# Patient Record
Sex: MALE | Race: WHITE | NOT HISPANIC OR LATINO | Employment: OTHER | URBAN - NONMETROPOLITAN AREA
[De-identification: names, ages, dates, MRNs, and addresses within clinical notes are randomized per-mention and may not be internally consistent; named-entity substitution may affect disease eponyms.]

---

## 2023-01-29 PROBLEM — E78.00 HYPERCHOLESTEROLEMIA: Status: ACTIVE | Noted: 2023-01-29

## 2023-01-29 PROBLEM — E03.9 HYPOTHYROIDISM: Status: ACTIVE | Noted: 2023-01-29

## 2023-01-29 PROBLEM — N18.32 STAGE 3B CHRONIC KIDNEY DISEASE (MULTI): Status: ACTIVE | Noted: 2023-01-29

## 2023-01-29 PROBLEM — N18.4 STAGE 4 CHRONIC KIDNEY DISEASE (MULTI): Status: ACTIVE | Noted: 2023-01-29

## 2023-01-29 PROBLEM — E66.9 CLASS 1 OBESITY WITH BODY MASS INDEX (BMI) OF 31.0 TO 31.9 IN ADULT: Status: ACTIVE | Noted: 2023-01-29

## 2023-01-29 PROBLEM — E66.811 CLASS 1 OBESITY WITH BODY MASS INDEX (BMI) OF 31.0 TO 31.9 IN ADULT: Status: ACTIVE | Noted: 2023-01-29

## 2023-01-29 PROBLEM — N40.0 ENLARGED PROSTATE WITHOUT LOWER URINARY TRACT SYMPTOMS (LUTS): Status: ACTIVE | Noted: 2023-01-29

## 2023-01-29 PROBLEM — I10 BENIGN ESSENTIAL HYPERTENSION: Status: ACTIVE | Noted: 2023-01-29

## 2023-01-29 PROBLEM — M10.9 GOUT: Status: ACTIVE | Noted: 2023-01-29

## 2023-01-29 PROBLEM — I10 HYPERTENSION, UNCONTROLLED: Status: ACTIVE | Noted: 2023-01-29

## 2023-01-29 RX ORDER — FINASTERIDE 5 MG/1
1 TABLET, FILM COATED ORAL DAILY
COMMUNITY
Start: 2013-02-05 | End: 2023-05-23

## 2023-01-29 RX ORDER — PRAVASTATIN SODIUM 20 MG/1
1 TABLET ORAL DAILY
COMMUNITY
Start: 2014-08-12 | End: 2023-05-23

## 2023-01-29 RX ORDER — TAMSULOSIN HYDROCHLORIDE 0.4 MG/1
0.4 CAPSULE ORAL
COMMUNITY
Start: 2013-02-05 | End: 2023-05-23

## 2023-01-29 RX ORDER — LEVOTHYROXINE SODIUM 50 UG/1
1 TABLET ORAL DAILY
COMMUNITY
Start: 2019-05-20 | End: 2023-05-23

## 2023-01-29 RX ORDER — ASPIRIN 81 MG/1
1 TABLET ORAL DAILY
COMMUNITY
Start: 2015-09-28

## 2023-01-29 RX ORDER — CARVEDILOL 25 MG/1
1 TABLET ORAL
COMMUNITY
Start: 2016-11-14 | End: 2023-05-23

## 2023-01-29 RX ORDER — LISINOPRIL 40 MG/1
1 TABLET ORAL DAILY
COMMUNITY
Start: 2017-06-21 | End: 2023-07-14 | Stop reason: SDUPTHER

## 2023-01-29 RX ORDER — ALLOPURINOL 100 MG/1
1 TABLET ORAL DAILY
COMMUNITY
Start: 2013-02-05 | End: 2023-05-23

## 2023-01-29 RX ORDER — AMLODIPINE BESYLATE 5 MG/1
1 TABLET ORAL DAILY
COMMUNITY
Start: 2017-03-16 | End: 2023-05-30 | Stop reason: SINTOL

## 2023-03-14 ENCOUNTER — OFFICE VISIT (OUTPATIENT)
Dept: PRIMARY CARE | Facility: CLINIC | Age: 83
End: 2023-03-14
Payer: MEDICARE

## 2023-03-14 VITALS
DIASTOLIC BLOOD PRESSURE: 70 MMHG | HEART RATE: 75 BPM | BODY MASS INDEX: 32.85 KG/M2 | HEIGHT: 69 IN | OXYGEN SATURATION: 99 % | WEIGHT: 221.8 LBS | TEMPERATURE: 96.8 F | SYSTOLIC BLOOD PRESSURE: 120 MMHG

## 2023-03-14 DIAGNOSIS — N18.4 STAGE 4 CHRONIC KIDNEY DISEASE (MULTI): ICD-10-CM

## 2023-03-14 DIAGNOSIS — E66.09 CLASS 1 OBESITY DUE TO EXCESS CALORIES WITH BODY MASS INDEX (BMI) OF 33.0 TO 33.9 IN ADULT, UNSPECIFIED WHETHER SERIOUS COMORBIDITY PRESENT: ICD-10-CM

## 2023-03-14 DIAGNOSIS — N40.0 ENLARGED PROSTATE WITHOUT LOWER URINARY TRACT SYMPTOMS (LUTS): Primary | ICD-10-CM

## 2023-03-14 DIAGNOSIS — Z00.00 ROUTINE GENERAL MEDICAL EXAMINATION AT HEALTH CARE FACILITY: ICD-10-CM

## 2023-03-14 DIAGNOSIS — E03.9 ACQUIRED HYPOTHYROIDISM: ICD-10-CM

## 2023-03-14 DIAGNOSIS — M1A.9XX0 CHRONIC GOUT WITHOUT TOPHUS, UNSPECIFIED CAUSE, UNSPECIFIED SITE: ICD-10-CM

## 2023-03-14 DIAGNOSIS — E78.00 HYPERCHOLESTEROLEMIA: ICD-10-CM

## 2023-03-14 PROBLEM — I10 BENIGN ESSENTIAL HYPERTENSION: Status: RESOLVED | Noted: 2023-01-29 | Resolved: 2023-03-14

## 2023-03-14 PROCEDURE — 3074F SYST BP LT 130 MM HG: CPT | Performed by: INTERNAL MEDICINE

## 2023-03-14 PROCEDURE — 1159F MED LIST DOCD IN RCRD: CPT | Performed by: INTERNAL MEDICINE

## 2023-03-14 PROCEDURE — 1160F RVW MEDS BY RX/DR IN RCRD: CPT | Performed by: INTERNAL MEDICINE

## 2023-03-14 PROCEDURE — 3078F DIAST BP <80 MM HG: CPT | Performed by: INTERNAL MEDICINE

## 2023-03-14 PROCEDURE — 99214 OFFICE O/P EST MOD 30 MIN: CPT | Performed by: INTERNAL MEDICINE

## 2023-03-14 PROCEDURE — G0439 PPPS, SUBSEQ VISIT: HCPCS | Performed by: INTERNAL MEDICINE

## 2023-03-14 PROCEDURE — 1170F FXNL STATUS ASSESSED: CPT | Performed by: INTERNAL MEDICINE

## 2023-03-14 PROCEDURE — G0444 DEPRESSION SCREEN ANNUAL: HCPCS | Performed by: INTERNAL MEDICINE

## 2023-03-14 RX ORDER — PATIROMER 16.8 G/1
16.8 POWDER, FOR SUSPENSION ORAL DAILY
COMMUNITY
Start: 2023-01-31

## 2023-03-14 ASSESSMENT — ENCOUNTER SYMPTOMS
BRUISES/BLEEDS EASILY: 0
UNEXPECTED WEIGHT CHANGE: 0
PALPITATIONS: 0
FEVER: 0
HEADACHES: 0
BLOOD IN STOOL: 0
ABDOMINAL PAIN: 0
DIARRHEA: 0
DEPRESSION: 0
FATIGUE: 0
ARTHRALGIAS: 0
SINUS PAIN: 0
COUGH: 0
LOSS OF SENSATION IN FEET: 0
WHEEZING: 0
SORE THROAT: 0
OCCASIONAL FEELINGS OF UNSTEADINESS: 0
DIFFICULTY URINATING: 0
DIZZINESS: 0

## 2023-03-14 ASSESSMENT — ACTIVITIES OF DAILY LIVING (ADL)
GROCERY_SHOPPING: INDEPENDENT
DOING_HOUSEWORK: INDEPENDENT
DRESSING: INDEPENDENT
TAKING_MEDICATION: INDEPENDENT
MANAGING_FINANCES: INDEPENDENT
BATHING: INDEPENDENT

## 2023-03-14 ASSESSMENT — PATIENT HEALTH QUESTIONNAIRE - PHQ9
1. LITTLE INTEREST OR PLEASURE IN DOING THINGS: NOT AT ALL
2. FEELING DOWN, DEPRESSED OR HOPELESS: NOT AT ALL
SUM OF ALL RESPONSES TO PHQ9 QUESTIONS 1 AND 2: 0

## 2023-03-14 NOTE — PROGRESS NOTES
"Subjective   Reason for Visit: Cesar Wood is an 82 y.o. male here for a Medicare Wellness visit.          Reviewed all medications by prescribing practitioner or clinical pharmacist (such as prescriptions, OTCs, herbal therapies and supplements) and documented in the medical record.    HPI  Currently kidney disease symptoms improving GFR improving patient seen by nephrology started on the new medication developed constipation now doing much better  - Constipation improving continue with current medication  - Chronic renal insufficiency improving  - Hypertension controlled  - Chronic gout controlled  - Hypothyroid continue with current medication  - Obesity counseled about weight loss  I spent >15minutes minutes face to face with individial providing recommendations for nutrition choices and exercise plan to help achieve weight reduction.  -I spent >15minutes minutes face to face with individial providing recommendations for nutrition choices and exercise plan to help achieve weight reduction.  - Immunizations up-to-date  - Screening colonoscopy not needed  Follow-up 6 months    Chronic renal failure Patient Self Assessment of Health Status  Patient Self Assessment: Good    Nutrition and Exercise  Current Diet: Well Balanced Diet  Adequate Fluid Intake: Yes  Caffeine: No  Exercise Frequency: No Exercise    Functional Ability/Level of Safety  Cognitive Impairment Observed: No cognitive impairment observed  Cognitive Impairment Reported: No cognitive impairment reported by patient or family    Home Safety Risk Factors: None    Patient Care Team:  Lizzie Quintero MD as PCP - General  Lizzie Quintero MD as PCP - [employer] PCP     Review of Systems    Objective   Vitals:  /70   Pulse 75   Temp 36 °C (96.8 °F)   Ht 1.74 m (5' 8.5\")   Wt 101 kg (221 lb 12.8 oz)   SpO2 99%   BMI 33.23 kg/m²       Physical Exam    Assessment/Plan   Problem List Items Addressed This Visit          " Genitourinary    Stage 3b chronic kidney disease     Currently kidney disease symptoms improving GFR improving patient seen by nephrology started on the new medication developed constipation now doing much better  - Constipation improving continue with current medication  - Chronic renal insufficiency improving  - Hypertension controlled  - Chronic gout controlled  - Hypothyroid continue with current medication  - Obesity counseled about weight loss  I spent >15minutes minutes face to face with individial providing recommendations for nutrition choices and exercise plan to help achieve weight reduction.  -I spent >15minutes minutes face to face with individial providing recommendations for nutrition choices and exercise plan to help achieve weight reduction.  - Immunizations up-to-date  - Screening colonoscopy not needed  Follow-up 6 months

## 2023-03-14 NOTE — PROGRESS NOTES
"Subjective   Reason for Visit: Cesar Wood is an 82 y.o. male here for a Medicare Wellness visit.          Reviewed all medications by prescribing practitioner or clinical pharmacist (such as prescriptions, OTCs, herbal therapies and supplements) and documented in the medical record.    HPI    Patient Self Assessment of Health Status  Patient Self Assessment: Good    Nutrition and Exercise  Current Diet: Well Balanced Diet  Adequate Fluid Intake: Yes  Caffeine: No  Exercise Frequency: No Exercise    Functional Ability/Level of Safety  Cognitive Impairment Observed: No cognitive impairment observed  Cognitive Impairment Reported: No cognitive impairment reported by patient or family    Home Safety Risk Factors: None    Patient Care Team:  Lizzie Quintero MD as PCP - General  Lizzie Quintero MD as PCP - [employer]vantage PCP     Review of Systems   Constitutional:  Negative for fatigue, fever and unexpected weight change.   HENT:  Negative for congestion, ear discharge, ear pain, mouth sores, sinus pain and sore throat.    Eyes:  Negative for visual disturbance.   Respiratory:  Negative for cough and wheezing.    Cardiovascular:  Negative for chest pain, palpitations and leg swelling.   Gastrointestinal:  Negative for abdominal pain, blood in stool and diarrhea.   Genitourinary:  Negative for difficulty urinating.   Musculoskeletal:  Negative for arthralgias.   Skin:  Negative for rash.   Neurological:  Negative for dizziness and headaches.   Hematological:  Does not bruise/bleed easily.   Psychiatric/Behavioral:  Negative for behavioral problems.    All other systems reviewed and are negative.      Objective   Vitals:  /70   Pulse 75   Temp 36 °C (96.8 °F)   Ht 1.74 m (5' 8.5\")   Wt 101 kg (221 lb 12.8 oz)   SpO2 99%   BMI 33.23 kg/m²       Physical Exam  Vitals and nursing note reviewed.   Constitutional:       Appearance: Normal appearance.   HENT:      Head: Normocephalic.      Nose: " Nose normal.   Eyes:      Conjunctiva/sclera: Conjunctivae normal.      Pupils: Pupils are equal, round, and reactive to light.   Cardiovascular:      Rate and Rhythm: Regular rhythm.   Pulmonary:      Effort: Pulmonary effort is normal.      Breath sounds: Normal breath sounds.   Abdominal:      General: Abdomen is flat.      Palpations: Abdomen is soft.   Musculoskeletal:      Cervical back: Neck supple.   Skin:     General: Skin is warm.   Neurological:      General: No focal deficit present.      Mental Status: He is oriented to person, place, and time.   Psychiatric:         Mood and Affect: Mood normal.         Assessment/Plan   Problem List Items Addressed This Visit          Genitourinary    Enlarged prostate without lower urinary tract symptoms (luts) - Primary    Stage 4 chronic kidney disease (CMS/HCC)    Relevant Orders    Disability Placard       Endocrine/Metabolic    Hypothyroidism       Other    Gout    Hypercholesterolemia     Other Visit Diagnoses       Class 1 obesity due to excess calories with body mass index (BMI) of 33.0 to 33.9 in adult, unspecified whether serious comorbidity present        Routine general medical examination at health care facility        Relevant Orders    1 Year Follow Up In Primary Care - Wellness Exam

## 2023-04-24 ENCOUNTER — OFFICE VISIT (OUTPATIENT)
Dept: PRIMARY CARE | Facility: CLINIC | Age: 83
End: 2023-04-24
Payer: MEDICARE

## 2023-04-24 VITALS
TEMPERATURE: 96.6 F | HEART RATE: 75 BPM | OXYGEN SATURATION: 96 % | WEIGHT: 225.6 LBS | SYSTOLIC BLOOD PRESSURE: 154 MMHG | BODY MASS INDEX: 33.8 KG/M2 | DIASTOLIC BLOOD PRESSURE: 72 MMHG

## 2023-04-24 DIAGNOSIS — E03.9 HYPOTHYROIDISM, UNSPECIFIED TYPE: ICD-10-CM

## 2023-04-24 DIAGNOSIS — I10 HYPERTENSION, UNCONTROLLED: ICD-10-CM

## 2023-04-24 DIAGNOSIS — R60.9 EDEMA, UNSPECIFIED TYPE: Primary | ICD-10-CM

## 2023-04-24 DIAGNOSIS — N18.32 STAGE 3B CHRONIC KIDNEY DISEASE (MULTI): ICD-10-CM

## 2023-04-24 PROCEDURE — 99214 OFFICE O/P EST MOD 30 MIN: CPT | Performed by: INTERNAL MEDICINE

## 2023-04-24 PROCEDURE — 3078F DIAST BP <80 MM HG: CPT | Performed by: INTERNAL MEDICINE

## 2023-04-24 PROCEDURE — 1159F MED LIST DOCD IN RCRD: CPT | Performed by: INTERNAL MEDICINE

## 2023-04-24 PROCEDURE — 1160F RVW MEDS BY RX/DR IN RCRD: CPT | Performed by: INTERNAL MEDICINE

## 2023-04-24 PROCEDURE — 1036F TOBACCO NON-USER: CPT | Performed by: INTERNAL MEDICINE

## 2023-04-24 PROCEDURE — 3077F SYST BP >= 140 MM HG: CPT | Performed by: INTERNAL MEDICINE

## 2023-04-24 RX ORDER — FUROSEMIDE 20 MG/1
20 TABLET ORAL DAILY
Qty: 30 TABLET | Refills: 11 | Status: SHIPPED | OUTPATIENT
Start: 2023-04-24 | End: 2024-03-18 | Stop reason: SDUPTHER

## 2023-04-24 ASSESSMENT — ENCOUNTER SYMPTOMS
DIZZINESS: 0
HEADACHES: 0
BLOOD IN STOOL: 0
DIARRHEA: 0
WHEEZING: 0
SORE THROAT: 0
FEVER: 0
ARTHRALGIAS: 0
SINUS PAIN: 0
PALPITATIONS: 0
DIFFICULTY URINATING: 0
UNEXPECTED WEIGHT CHANGE: 0
BRUISES/BLEEDS EASILY: 0
ABDOMINAL PAIN: 0
COUGH: 0
FATIGUE: 0

## 2023-04-24 NOTE — PROGRESS NOTES
Subjective   Patient ID: Cesar Wood is a 83 y.o. male who presents for Leg Swelling (X 1 month).    Leg swelling for 1 months worsening gaining weight 2 to 3 pounds also no shortness of breath no chest pain no other complaints  - Bilateral lower extremity edema between hypertension chronic renal disease and medication side effects patient counseled about low-salt diet  Add Lasix small dose 20 mg for 4 weeks re assist patient in 4 weeks  - Chronic kidney disease avoid salt maximize medical management control blood pressure  - Hypertension borderline high monitor blood pressure continue with carvedilol lisinopril  -Chronic hyperkalemia continue with current medication repeat potassium level next appointment call if any worsening of symptoms  Follow-up 4 weeks           Review of Systems   Constitutional:  Negative for fatigue, fever and unexpected weight change.   HENT:  Negative for congestion, ear discharge, ear pain, mouth sores, sinus pain and sore throat.    Eyes:  Negative for visual disturbance.   Respiratory:  Negative for cough and wheezing.    Cardiovascular:  Positive for leg swelling. Negative for chest pain and palpitations.   Gastrointestinal:  Negative for abdominal pain, blood in stool and diarrhea.   Genitourinary:  Negative for difficulty urinating.   Musculoskeletal:  Negative for arthralgias.   Skin:  Negative for rash.   Neurological:  Negative for dizziness and headaches.   Hematological:  Does not bruise/bleed easily.   Psychiatric/Behavioral:  Negative for behavioral problems.    All other systems reviewed and are negative.      Objective   No results found for: HGBA1C   /72   Pulse 75   Temp 35.9 °C (96.6 °F)   Wt 102 kg (225 lb 9.6 oz)   SpO2 96%   BMI 33.80 kg/m²   Lab Results   Component Value Date    WBC 7.8 01/30/2023    HGB 14.3 01/30/2023    HCT 43.8 01/30/2023     01/30/2023    CHOL 127 01/30/2023    TRIG 177 (H) 01/30/2023    HDL 39.0 (A) 01/30/2023    ALT 13  01/30/2023    AST 12 01/30/2023     02/08/2023    K 4.4 02/08/2023     (H) 02/08/2023    CREATININE 1.80 (H) 02/08/2023    BUN 47 (H) 02/08/2023    CO2 22 02/08/2023    TSH 2.81 01/30/2023    PSA 0.57 10/26/2017     par   Physical Exam  Vitals and nursing note reviewed.   Constitutional:       Appearance: Normal appearance.   HENT:      Head: Normocephalic.      Nose: Nose normal.   Eyes:      Conjunctiva/sclera: Conjunctivae normal.      Pupils: Pupils are equal, round, and reactive to light.   Cardiovascular:      Rate and Rhythm: Regular rhythm.   Pulmonary:      Effort: Pulmonary effort is normal.      Breath sounds: Normal breath sounds.   Abdominal:      General: Abdomen is flat.      Palpations: Abdomen is soft.   Musculoskeletal:      Cervical back: Neck supple.      Right lower leg: Edema present.      Left lower leg: Edema present.   Skin:     General: Skin is warm.   Neurological:      General: No focal deficit present.      Mental Status: He is oriented to person, place, and time.   Psychiatric:         Mood and Affect: Mood normal.         Assessment/Plan   Cesar was seen today for leg swelling.  Diagnoses and all orders for this visit:  Edema, unspecified type (Primary)  -     furosemide (Lasix) 20 mg tablet; Take 1 tablet (20 mg) by mouth once daily.  Hypertension, uncontrolled  Stage 3b chronic kidney disease  Hypothyroidism, unspecified type  Other orders  -     Follow Up In Primary Care; Future   Leg swelling for 1 months worsening gaining weight 2 to 3 pounds also no shortness of breath no chest pain no other complaints  - Bilateral lower extremity edema between hypertension chronic renal disease and medication side effects patient counseled about low-salt diet  Add Lasix small dose 20 mg for 4 weeks re assist patient in 4 weeks  - Chronic kidney disease avoid salt maximize medical management control blood pressure  - Hypertension borderline high monitor blood pressure continue with  carvedilol lisinopril  -Chronic hyperkalemia continue with current medication repeat potassium level next appointment call if any worsening of symptoms  Follow-up 4 weeks

## 2023-05-23 DIAGNOSIS — I10 HYPERTENSION, UNCONTROLLED: ICD-10-CM

## 2023-05-23 DIAGNOSIS — E03.9 HYPOTHYROIDISM, UNSPECIFIED TYPE: ICD-10-CM

## 2023-05-23 DIAGNOSIS — E78.00 HYPERCHOLESTEROLEMIA: ICD-10-CM

## 2023-05-23 DIAGNOSIS — M1A.9XX0 CHRONIC GOUT WITHOUT TOPHUS, UNSPECIFIED CAUSE, UNSPECIFIED SITE: ICD-10-CM

## 2023-05-23 DIAGNOSIS — N18.4 STAGE 4 CHRONIC KIDNEY DISEASE (MULTI): ICD-10-CM

## 2023-05-23 RX ORDER — FINASTERIDE 5 MG/1
TABLET, FILM COATED ORAL
Qty: 90 TABLET | Refills: 0 | Status: SHIPPED | OUTPATIENT
Start: 2023-05-23 | End: 2023-09-14 | Stop reason: SDUPTHER

## 2023-05-23 RX ORDER — TAMSULOSIN HYDROCHLORIDE 0.4 MG/1
CAPSULE ORAL
Qty: 90 CAPSULE | Refills: 0 | Status: SHIPPED | OUTPATIENT
Start: 2023-05-23 | End: 2023-09-14 | Stop reason: SDUPTHER

## 2023-05-23 RX ORDER — CARVEDILOL 25 MG/1
25 TABLET ORAL
Qty: 180 TABLET | Refills: 0 | Status: SHIPPED | OUTPATIENT
Start: 2023-05-23 | End: 2023-09-14 | Stop reason: SDUPTHER

## 2023-05-23 RX ORDER — LEVOTHYROXINE SODIUM 50 UG/1
TABLET ORAL
Qty: 90 TABLET | Refills: 0 | Status: SHIPPED | OUTPATIENT
Start: 2023-05-23 | End: 2023-09-14 | Stop reason: SDUPTHER

## 2023-05-23 RX ORDER — PRAVASTATIN SODIUM 20 MG/1
TABLET ORAL
Qty: 90 TABLET | Refills: 0 | Status: SHIPPED | OUTPATIENT
Start: 2023-05-23 | End: 2023-09-14 | Stop reason: SDUPTHER

## 2023-05-23 RX ORDER — ALLOPURINOL 100 MG/1
100 TABLET ORAL DAILY
Qty: 90 TABLET | Refills: 0 | Status: SHIPPED | OUTPATIENT
Start: 2023-05-23 | End: 2023-09-14 | Stop reason: SDUPTHER

## 2023-05-30 ENCOUNTER — OFFICE VISIT (OUTPATIENT)
Dept: PRIMARY CARE | Facility: CLINIC | Age: 83
End: 2023-05-30
Payer: MEDICARE

## 2023-05-30 VITALS
SYSTOLIC BLOOD PRESSURE: 140 MMHG | DIASTOLIC BLOOD PRESSURE: 68 MMHG | BODY MASS INDEX: 32.96 KG/M2 | TEMPERATURE: 96.8 F | HEART RATE: 73 BPM | OXYGEN SATURATION: 100 % | WEIGHT: 220 LBS

## 2023-05-30 DIAGNOSIS — I10 HYPERTENSION, UNCONTROLLED: ICD-10-CM

## 2023-05-30 DIAGNOSIS — E03.9 HYPOTHYROIDISM, UNSPECIFIED TYPE: ICD-10-CM

## 2023-05-30 DIAGNOSIS — N18.4 STAGE 4 CHRONIC KIDNEY DISEASE (MULTI): ICD-10-CM

## 2023-05-30 DIAGNOSIS — R60.9 EDEMA, UNSPECIFIED TYPE: Primary | Chronic | ICD-10-CM

## 2023-05-30 DIAGNOSIS — E78.00 HYPERCHOLESTEROLEMIA: ICD-10-CM

## 2023-05-30 PROCEDURE — 99214 OFFICE O/P EST MOD 30 MIN: CPT | Performed by: INTERNAL MEDICINE

## 2023-05-30 PROCEDURE — 3078F DIAST BP <80 MM HG: CPT | Performed by: INTERNAL MEDICINE

## 2023-05-30 PROCEDURE — 1159F MED LIST DOCD IN RCRD: CPT | Performed by: INTERNAL MEDICINE

## 2023-05-30 PROCEDURE — 1160F RVW MEDS BY RX/DR IN RCRD: CPT | Performed by: INTERNAL MEDICINE

## 2023-05-30 PROCEDURE — 1036F TOBACCO NON-USER: CPT | Performed by: INTERNAL MEDICINE

## 2023-05-30 PROCEDURE — 3077F SYST BP >= 140 MM HG: CPT | Performed by: INTERNAL MEDICINE

## 2023-05-30 ASSESSMENT — ENCOUNTER SYMPTOMS
COUGH: 0
ARTHRALGIAS: 0
HEADACHES: 0
DIARRHEA: 0
BRUISES/BLEEDS EASILY: 0
WHEEZING: 0
DIZZINESS: 0
BLOOD IN STOOL: 0
PALPITATIONS: 0
SINUS PAIN: 0
UNEXPECTED WEIGHT CHANGE: 0
DIFFICULTY URINATING: 0
ABDOMINAL PAIN: 0
SORE THROAT: 0
FEVER: 0
FATIGUE: 0

## 2023-05-30 NOTE — PROGRESS NOTES
Subjective   Patient ID: Cesar Wood is a 83 y.o. male who presents for Edema (Normal in morning, gets worse through the day).    - Leg swelling improved still residual effects at the end of the day  Needs to continue with Lasix 20 mg daily  -Edema residual due to amlodipine discontinue amlodipine 5 mg  - Hypertension controlled continue with carvedilol lisinopril as recommended  - Chronic kidney disease continue low-salt avoid any NSAID follow-up nephrology DR MOSER  - Hypertension borderline high monitor blood pressure continue with carvedilol lisinopril  -Chronic hyperkalemia continue with current medication repeat potassium level next appointment call if any worsening of symptoms  Follow-up in September as scheduled      Edema    Pertinent negative symptoms include no abdominal pain, no chest pain, no cough, no fatigue, no fever and no palpitations.          Review of Systems   Constitutional:  Negative for fatigue, fever and unexpected weight change.   HENT:  Negative for congestion, ear discharge, ear pain, mouth sores, sinus pain and sore throat.    Eyes:  Negative for visual disturbance.   Respiratory:  Negative for cough and wheezing.    Cardiovascular:  Positive for leg swelling. Negative for chest pain and palpitations.   Gastrointestinal:  Negative for abdominal pain, blood in stool and diarrhea.   Genitourinary:  Negative for difficulty urinating.   Musculoskeletal:  Negative for arthralgias.   Skin:  Negative for rash.   Neurological:  Negative for dizziness and headaches.   Hematological:  Does not bruise/bleed easily.   Psychiatric/Behavioral:  Negative for behavioral problems.    All other systems reviewed and are negative.      Objective   No results found for: HGBA1C   /68   Pulse 73   Temp 36 °C (96.8 °F)   Wt 99.8 kg (220 lb)   SpO2 100%   BMI 32.96 kg/m²   Lab Results   Component Value Date    WBC 7.8 01/30/2023    HGB 14.3 01/30/2023    HCT 43.8 01/30/2023      01/30/2023    CHOL 127 01/30/2023    TRIG 177 (H) 01/30/2023    HDL 39.0 (A) 01/30/2023    ALT 13 01/30/2023    AST 12 01/30/2023     02/08/2023    K 4.4 02/08/2023     (H) 02/08/2023    CREATININE 1.80 (H) 02/08/2023    BUN 47 (H) 02/08/2023    CO2 22 02/08/2023    TSH 2.81 01/30/2023    PSA 0.57 10/26/2017     par   Physical Exam  Vitals and nursing note reviewed.   Constitutional:       Appearance: Normal appearance.   HENT:      Head: Normocephalic.      Nose: Nose normal.   Eyes:      Conjunctiva/sclera: Conjunctivae normal.      Pupils: Pupils are equal, round, and reactive to light.   Cardiovascular:      Rate and Rhythm: Regular rhythm.   Pulmonary:      Effort: Pulmonary effort is normal.      Breath sounds: Normal breath sounds.   Abdominal:      General: Abdomen is flat.      Palpations: Abdomen is soft.   Musculoskeletal:      Cervical back: Neck supple.      Right lower leg: Edema present.      Left lower leg: Edema present.   Skin:     General: Skin is warm.   Neurological:      General: No focal deficit present.      Mental Status: He is oriented to person, place, and time.   Psychiatric:         Mood and Affect: Mood normal.         Assessment/Plan   Cesar was seen today for edema.  Diagnoses and all orders for this visit:  Edema, unspecified type (Primary)  Hypertension, uncontrolled  Hypothyroidism, unspecified type  Stage 4 chronic kidney disease (CMS/HCC)  Hypercholesterolemia  Other orders  -     Follow Up In Primary Care   - Leg swelling improved still residual effects at the end of the day  Needs to continue with Lasix 20 mg daily  -Edema residual due to amlodipine discontinue amlodipine 5 mg  - Hypertension controlled continue with carvedilol lisinopril as recommended  - Chronic kidney disease continue low-salt avoid any NSAID follow-up nephrology DR MOSER  - Hypertension borderline high monitor blood pressure continue with carvedilol lisinopril  -Chronic hyperkalemia continue  with current medication repeat potassium level next appointment call if any worsening of symptoms  Follow-up in September as scheduled

## 2023-07-13 DIAGNOSIS — I10 HYPERTENSION, UNCONTROLLED: ICD-10-CM

## 2023-07-14 RX ORDER — LISINOPRIL 40 MG/1
TABLET ORAL
Qty: 90 TABLET | Refills: 0 | Status: SHIPPED | OUTPATIENT
Start: 2023-07-14 | End: 2023-10-25

## 2023-09-07 LAB
ALBUMIN (G/DL) IN SER/PLAS: 3.9 G/DL (ref 3.4–5)
ALBUMIN (MG/L) IN URINE: <7 MG/L
ALBUMIN/CREATININE (UG/MG) IN URINE: NORMAL UG/MG CRT (ref 0–30)
ANION GAP IN SER/PLAS: 11 MMOL/L (ref 10–20)
CALCIUM (MG/DL) IN SER/PLAS: 9.2 MG/DL (ref 8.6–10.3)
CARBON DIOXIDE, TOTAL (MMOL/L) IN SER/PLAS: 27 MMOL/L (ref 21–32)
CHLORIDE (MMOL/L) IN SER/PLAS: 105 MMOL/L (ref 98–107)
CREATININE (MG/DL) IN SER/PLAS: 2.02 MG/DL (ref 0.5–1.3)
CREATININE (MG/DL) IN URINE: 123 MG/DL (ref 20–370)
ERYTHROCYTE DISTRIBUTION WIDTH (RATIO) BY AUTOMATED COUNT: 13.4 % (ref 11.5–14.5)
ERYTHROCYTE MEAN CORPUSCULAR HEMOGLOBIN CONCENTRATION (G/DL) BY AUTOMATED: 32.5 G/DL (ref 32–36)
ERYTHROCYTE MEAN CORPUSCULAR VOLUME (FL) BY AUTOMATED COUNT: 95 FL (ref 80–100)
ERYTHROCYTES (10*6/UL) IN BLOOD BY AUTOMATED COUNT: 4.4 X10E12/L (ref 4.5–5.9)
FERRITIN (UG/LL) IN SER/PLAS: 576 UG/L (ref 20–300)
GFR MALE: 32 ML/MIN/1.73M2
GLUCOSE (MG/DL) IN SER/PLAS: 123 MG/DL (ref 74–99)
HEMATOCRIT (%) IN BLOOD BY AUTOMATED COUNT: 41.6 % (ref 41–52)
HEMOGLOBIN (G/DL) IN BLOOD: 13.5 G/DL (ref 13.5–17.5)
IRON (UG/DL) IN SER/PLAS: 67 UG/DL (ref 35–150)
IRON BINDING CAPACITY (UG/DL) IN SER/PLAS: 236 UG/DL (ref 240–445)
IRON SATURATION (%) IN SER/PLAS: 28 % (ref 25–45)
LEUKOCYTES (10*3/UL) IN BLOOD BY AUTOMATED COUNT: 7.3 X10E9/L (ref 4.4–11.3)
PARATHYRIN INTACT (PG/ML) IN SER/PLAS: 38.2 PG/ML (ref 18.5–88)
PHOSPHATE (MG/DL) IN SER/PLAS: 2.2 MG/DL (ref 2.5–4.9)
PLATELETS (10*3/UL) IN BLOOD AUTOMATED COUNT: 160 X10E9/L (ref 150–450)
POTASSIUM (MMOL/L) IN SER/PLAS: 4.1 MMOL/L (ref 3.5–5.3)
SODIUM (MMOL/L) IN SER/PLAS: 139 MMOL/L (ref 136–145)
URATE (MG/DL) IN SER/PLAS: 8 MG/DL (ref 4–7.5)
UREA NITROGEN (MG/DL) IN SER/PLAS: 45 MG/DL (ref 6–23)

## 2023-09-14 ENCOUNTER — OFFICE VISIT (OUTPATIENT)
Dept: PRIMARY CARE | Facility: CLINIC | Age: 83
End: 2023-09-14
Payer: MEDICARE

## 2023-09-14 VITALS
OXYGEN SATURATION: 99 % | WEIGHT: 220.6 LBS | SYSTOLIC BLOOD PRESSURE: 126 MMHG | TEMPERATURE: 97.1 F | BODY MASS INDEX: 33.05 KG/M2 | DIASTOLIC BLOOD PRESSURE: 64 MMHG | HEART RATE: 73 BPM

## 2023-09-14 DIAGNOSIS — M1A.9XX0 CHRONIC GOUT WITHOUT TOPHUS, UNSPECIFIED CAUSE, UNSPECIFIED SITE: ICD-10-CM

## 2023-09-14 DIAGNOSIS — E78.00 HYPERCHOLESTEROLEMIA: ICD-10-CM

## 2023-09-14 DIAGNOSIS — E03.9 HYPOTHYROIDISM, UNSPECIFIED TYPE: ICD-10-CM

## 2023-09-14 DIAGNOSIS — R60.9 EDEMA, UNSPECIFIED TYPE: ICD-10-CM

## 2023-09-14 DIAGNOSIS — Z23 FLU VACCINE NEED: ICD-10-CM

## 2023-09-14 DIAGNOSIS — N40.0 ENLARGED PROSTATE WITHOUT LOWER URINARY TRACT SYMPTOMS (LUTS): ICD-10-CM

## 2023-09-14 DIAGNOSIS — E03.9 ACQUIRED HYPOTHYROIDISM: ICD-10-CM

## 2023-09-14 DIAGNOSIS — N18.4 STAGE 4 CHRONIC KIDNEY DISEASE (MULTI): ICD-10-CM

## 2023-09-14 DIAGNOSIS — N18.32 STAGE 3B CHRONIC KIDNEY DISEASE (MULTI): Primary | ICD-10-CM

## 2023-09-14 DIAGNOSIS — I10 HYPERTENSION, UNCONTROLLED: ICD-10-CM

## 2023-09-14 PROCEDURE — 90662 IIV NO PRSV INCREASED AG IM: CPT | Performed by: INTERNAL MEDICINE

## 2023-09-14 PROCEDURE — 99214 OFFICE O/P EST MOD 30 MIN: CPT | Performed by: INTERNAL MEDICINE

## 2023-09-14 PROCEDURE — 1036F TOBACCO NON-USER: CPT | Performed by: INTERNAL MEDICINE

## 2023-09-14 PROCEDURE — G0008 ADMIN INFLUENZA VIRUS VAC: HCPCS | Performed by: INTERNAL MEDICINE

## 2023-09-14 PROCEDURE — 3074F SYST BP LT 130 MM HG: CPT | Performed by: INTERNAL MEDICINE

## 2023-09-14 PROCEDURE — 1159F MED LIST DOCD IN RCRD: CPT | Performed by: INTERNAL MEDICINE

## 2023-09-14 PROCEDURE — 1160F RVW MEDS BY RX/DR IN RCRD: CPT | Performed by: INTERNAL MEDICINE

## 2023-09-14 PROCEDURE — 3078F DIAST BP <80 MM HG: CPT | Performed by: INTERNAL MEDICINE

## 2023-09-14 RX ORDER — FINASTERIDE 5 MG/1
5 TABLET, FILM COATED ORAL DAILY
Qty: 90 TABLET | Refills: 1 | Status: SHIPPED | OUTPATIENT
Start: 2023-09-14 | End: 2024-03-12

## 2023-09-14 RX ORDER — ALLOPURINOL 100 MG/1
100 TABLET ORAL DAILY
Qty: 90 TABLET | Refills: 1 | Status: SHIPPED | OUTPATIENT
Start: 2023-09-14 | End: 2024-03-12

## 2023-09-14 RX ORDER — TAMSULOSIN HYDROCHLORIDE 0.4 MG/1
CAPSULE ORAL
Qty: 90 CAPSULE | Refills: 1 | Status: SHIPPED | OUTPATIENT
Start: 2023-09-14 | End: 2024-03-12

## 2023-09-14 RX ORDER — LEVOTHYROXINE SODIUM 50 UG/1
50 TABLET ORAL DAILY
Qty: 90 TABLET | Refills: 1 | Status: SHIPPED | OUTPATIENT
Start: 2023-09-14 | End: 2024-03-04

## 2023-09-14 RX ORDER — PRAVASTATIN SODIUM 20 MG/1
20 TABLET ORAL DAILY
Qty: 90 TABLET | Refills: 1 | Status: SHIPPED | OUTPATIENT
Start: 2023-09-14 | End: 2024-03-12

## 2023-09-14 RX ORDER — CARVEDILOL 25 MG/1
25 TABLET ORAL
Qty: 180 TABLET | Refills: 0 | Status: SHIPPED | OUTPATIENT
Start: 2023-09-14 | End: 2023-12-08 | Stop reason: SDUPTHER

## 2023-09-14 ASSESSMENT — ENCOUNTER SYMPTOMS
UNEXPECTED WEIGHT CHANGE: 0
DIARRHEA: 0
HEADACHES: 0
FATIGUE: 0
COUGH: 0
PALPITATIONS: 0
HYPERTENSION: 1
DIZZINESS: 0
DIFFICULTY URINATING: 0
SORE THROAT: 0
ABDOMINAL PAIN: 0
FEVER: 0
BLOOD IN STOOL: 0
ARTHRALGIAS: 0
SINUS PAIN: 0
WHEEZING: 0
BRUISES/BLEEDS EASILY: 0

## 2023-09-14 NOTE — PROGRESS NOTES
Subjective   Patient ID: Cesar Wood is a 83 y.o. male who presents for Hypothyroidism, Hypertension, Gout, Med Refill, and Flu Vaccine (given).    -Chronic gout controlled continues allopurinol no recurrence  - BPH continue with tamsulosin symptoms are controlled  - Chronic leg swelling improved continue with Lasix 20 mg daily continue low-salt diet  - Chronic kidney disease and hyperkalemia last potassium 4.1 follow-up nephrology as recommended scheduled today  Continue on Veltassa  - Hypothyroid controlled continue levothyroxine follow-up thyroid function test in 6 months  - Hypertension controlled continue with carvedilol lisinopril as recommended  - Chronic kidney disease continue low-salt avoid any NSAID follow-up nephrology DR MOSER  - Hypertension doing very well controlled continue with current current medication  -Chronic hyperkalemia continue with current medication  -Flu vaccine today  Follow-up in March 2024 for Medicare physical      Hypertension  Pertinent negatives include no chest pain, headaches or palpitations.   Med Refill  Pertinent negatives include no abdominal pain, arthralgias, chest pain, congestion, coughing, fatigue, fever, headaches, rash or sore throat.          Review of Systems   Constitutional:  Negative for fatigue, fever and unexpected weight change.   HENT:  Negative for congestion, ear discharge, ear pain, mouth sores, sinus pain and sore throat.    Eyes:  Negative for visual disturbance.   Respiratory:  Negative for cough and wheezing.    Cardiovascular:  Negative for chest pain, palpitations and leg swelling.   Gastrointestinal:  Negative for abdominal pain, blood in stool and diarrhea.   Genitourinary:  Negative for difficulty urinating.   Musculoskeletal:  Negative for arthralgias.   Skin:  Negative for rash.   Neurological:  Negative for dizziness and headaches.   Hematological:  Does not bruise/bleed easily.   Psychiatric/Behavioral:  Negative for behavioral  "problems.    All other systems reviewed and are negative.      Objective   No results found for: \"HGBA1C\"   /64   Pulse 73   Temp 36.2 °C (97.1 °F)   Wt 100 kg (220 lb 9.6 oz)   SpO2 99%   BMI 33.05 kg/m²   Lab Results   Component Value Date    WBC 7.3 09/07/2023    HGB 13.5 09/07/2023    HCT 41.6 09/07/2023     09/07/2023    CHOL 127 01/30/2023    TRIG 177 (H) 01/30/2023    HDL 39.0 (A) 01/30/2023    ALT 13 01/30/2023    AST 12 01/30/2023     09/07/2023    K 4.1 09/07/2023     09/07/2023    CREATININE 2.02 (H) 09/07/2023    BUN 45 (H) 09/07/2023    CO2 27 09/07/2023    TSH 2.81 01/30/2023    PSA 0.57 10/26/2017     par   Physical Exam  Vitals and nursing note reviewed.   Constitutional:       Appearance: Normal appearance.   HENT:      Head: Normocephalic.      Nose: Nose normal.   Eyes:      Conjunctiva/sclera: Conjunctivae normal.      Pupils: Pupils are equal, round, and reactive to light.   Cardiovascular:      Rate and Rhythm: Regular rhythm.   Pulmonary:      Effort: Pulmonary effort is normal.      Breath sounds: Normal breath sounds.   Abdominal:      General: Abdomen is flat.      Palpations: Abdomen is soft.   Musculoskeletal:      Cervical back: Neck supple.   Skin:     General: Skin is warm.   Neurological:      General: No focal deficit present.      Mental Status: He is oriented to person, place, and time.   Psychiatric:         Mood and Affect: Mood normal.       Assessment/Plan   Cesar was seen today for hypothyroidism, hypertension, gout, med refill and flu vaccine.  Diagnoses and all orders for this visit:  Stage 3b chronic kidney disease (CMS/HCC) (Primary)  Flu vaccine need  -     Flu vaccine, quadrivalent, high-dose, preservative free, age 65y+ (FLUZONE)  Hypertension, uncontrolled  -     carvedilol (Coreg) 25 mg tablet; Take 1 tablet (25 mg) by mouth 2 times a day with meals.  -     finasteride (Proscar) 5 mg tablet; Take 1 tablet (5 mg) by mouth once daily. Do not " crush, chew, or split.  Chronic gout without tophus, unspecified cause, unspecified site  -     allopurinol (Zyloprim) 100 mg tablet; Take 1 tablet (100 mg) by mouth once daily.  Stage 4 chronic kidney disease (CMS/HCC)  -     tamsulosin (Flomax) 0.4 mg 24 hr capsule; TAKE ONE CAPSULE BY MOUTH EVERY DAY 30 MINUTES AFTER THE SAME MEAL EACH DAY  Hypercholesterolemia  -     pravastatin (Pravachol) 20 mg tablet; Take 1 tablet (20 mg) by mouth once daily.  Hypothyroidism, unspecified type  -     levothyroxine (Synthroid, Levoxyl) 50 mcg tablet; Take 1 tablet (50 mcg) by mouth once daily.  Acquired hypothyroidism  Enlarged prostate without lower urinary tract symptoms (luts)  Edema, unspecified type  Other orders  -     Follow Up In Primary Care - Health Maintenance; Future   Chronic gout controlled continues allopurinol no recurrence  - BPH continue with tamsulosin symptoms are controlled  - Chronic leg swelling improved continue with Lasix 20 mg daily continue low-salt diet  - Chronic kidney disease and hyperkalemia last potassium 4.1 follow-up nephrology as recommended scheduled today  Continue on Veltassa  - Hypothyroid controlled continue levothyroxine follow-up thyroid function test in 6 months  - Hypertension controlled continue with carvedilol lisinopril as recommended  - Chronic kidney disease continue low-salt avoid any NSAID follow-up nephrology DR MOSER  - Hypertension doing very well controlled continue with current current medication  -Chronic hyperkalemia continue with current medication  -Flu vaccine today  Follow-up in March 2024 for Medicare physical

## 2023-10-25 DIAGNOSIS — I10 HYPERTENSION, UNCONTROLLED: ICD-10-CM

## 2023-10-25 RX ORDER — LISINOPRIL 40 MG/1
TABLET ORAL
Qty: 90 TABLET | Refills: 0 | Status: SHIPPED | OUTPATIENT
Start: 2023-10-25 | End: 2024-01-23

## 2023-11-14 ENCOUNTER — LAB (OUTPATIENT)
Dept: LAB | Facility: LAB | Age: 83
End: 2023-11-14
Payer: MEDICARE

## 2023-11-14 DIAGNOSIS — N18.32 CHRONIC KIDNEY DISEASE, STAGE 3B (MULTI): ICD-10-CM

## 2023-11-14 DIAGNOSIS — M10.9 GOUT, UNSPECIFIED: ICD-10-CM

## 2023-11-14 DIAGNOSIS — I10 ESSENTIAL (PRIMARY) HYPERTENSION: ICD-10-CM

## 2023-11-14 DIAGNOSIS — N40.0 BENIGN PROSTATIC HYPERPLASIA WITHOUT LOWER URINARY TRACT SYMPTOMS: Primary | ICD-10-CM

## 2023-11-14 DIAGNOSIS — E87.5 HYPERKALEMIA: ICD-10-CM

## 2023-11-14 LAB — POTASSIUM SERPL-SCNC: 4.3 MMOL/L (ref 3.5–5.3)

## 2023-11-14 PROCEDURE — 36415 COLL VENOUS BLD VENIPUNCTURE: CPT

## 2023-12-07 DIAGNOSIS — I10 HYPERTENSION, UNCONTROLLED: ICD-10-CM

## 2023-12-08 RX ORDER — CARVEDILOL 25 MG/1
25 TABLET ORAL
Qty: 180 TABLET | Refills: 0 | Status: SHIPPED | OUTPATIENT
Start: 2023-12-08 | End: 2024-03-12

## 2024-01-23 DIAGNOSIS — I10 HYPERTENSION, UNCONTROLLED: ICD-10-CM

## 2024-01-23 RX ORDER — LISINOPRIL 40 MG/1
TABLET ORAL
Qty: 90 TABLET | Refills: 0 | Status: SHIPPED | OUTPATIENT
Start: 2024-01-23 | End: 2024-03-18 | Stop reason: SDUPTHER

## 2024-03-03 DIAGNOSIS — E03.9 HYPOTHYROIDISM, UNSPECIFIED TYPE: ICD-10-CM

## 2024-03-04 RX ORDER — LEVOTHYROXINE SODIUM 50 UG/1
50 TABLET ORAL DAILY
Qty: 90 TABLET | Refills: 0 | Status: SHIPPED | OUTPATIENT
Start: 2024-03-04 | End: 2024-05-28

## 2024-03-11 DIAGNOSIS — E78.00 HYPERCHOLESTEROLEMIA: ICD-10-CM

## 2024-03-11 DIAGNOSIS — N18.4 STAGE 4 CHRONIC KIDNEY DISEASE (MULTI): ICD-10-CM

## 2024-03-11 DIAGNOSIS — I10 HYPERTENSION, UNCONTROLLED: ICD-10-CM

## 2024-03-11 DIAGNOSIS — M1A.9XX0 CHRONIC GOUT WITHOUT TOPHUS, UNSPECIFIED CAUSE, UNSPECIFIED SITE: ICD-10-CM

## 2024-03-12 RX ORDER — PRAVASTATIN SODIUM 20 MG/1
20 TABLET ORAL DAILY
Qty: 90 TABLET | Refills: 0 | Status: SHIPPED | OUTPATIENT
Start: 2024-03-12

## 2024-03-12 RX ORDER — CARVEDILOL 25 MG/1
25 TABLET ORAL
Qty: 180 TABLET | Refills: 0 | Status: SHIPPED | OUTPATIENT
Start: 2024-03-12

## 2024-03-12 RX ORDER — ALLOPURINOL 100 MG/1
100 TABLET ORAL DAILY
Qty: 90 TABLET | Refills: 0 | Status: SHIPPED | OUTPATIENT
Start: 2024-03-12

## 2024-03-12 RX ORDER — FINASTERIDE 5 MG/1
TABLET, FILM COATED ORAL
Qty: 90 TABLET | Refills: 0 | Status: SHIPPED | OUTPATIENT
Start: 2024-03-12

## 2024-03-12 RX ORDER — TAMSULOSIN HYDROCHLORIDE 0.4 MG/1
CAPSULE ORAL
Qty: 90 CAPSULE | Refills: 0 | Status: SHIPPED | OUTPATIENT
Start: 2024-03-12

## 2024-03-14 ENCOUNTER — LAB (OUTPATIENT)
Dept: LAB | Facility: LAB | Age: 84
End: 2024-03-14
Payer: MEDICARE

## 2024-03-14 DIAGNOSIS — N40.0 BENIGN PROSTATIC HYPERPLASIA WITHOUT LOWER URINARY TRACT SYMPTOMS: Primary | ICD-10-CM

## 2024-03-14 DIAGNOSIS — M10.9 GOUT, UNSPECIFIED: ICD-10-CM

## 2024-03-14 DIAGNOSIS — I10 ESSENTIAL (PRIMARY) HYPERTENSION: ICD-10-CM

## 2024-03-14 DIAGNOSIS — E87.5 HYPERKALEMIA: ICD-10-CM

## 2024-03-14 LAB
ALBUMIN SERPL BCP-MCNC: 4 G/DL (ref 3.4–5)
ANION GAP SERPL CALC-SCNC: 15 MMOL/L (ref 10–20)
BUN SERPL-MCNC: 54 MG/DL (ref 6–23)
CALCIUM SERPL-MCNC: 9.6 MG/DL (ref 8.6–10.3)
CHLORIDE SERPL-SCNC: 104 MMOL/L (ref 98–107)
CO2 SERPL-SCNC: 25 MMOL/L (ref 21–32)
CREAT SERPL-MCNC: 2.41 MG/DL (ref 0.5–1.3)
CREAT UR-MCNC: 40.4 MG/DL (ref 20–370)
EGFRCR SERPLBLD CKD-EPI 2021: 26 ML/MIN/1.73M*2
ERYTHROCYTE [DISTWIDTH] IN BLOOD BY AUTOMATED COUNT: 13.8 % (ref 11.5–14.5)
GLUCOSE SERPL-MCNC: 123 MG/DL (ref 74–99)
HCT VFR BLD AUTO: 44.2 % (ref 41–52)
HGB BLD-MCNC: 14.1 G/DL (ref 13.5–17.5)
MCH RBC QN AUTO: 30.3 PG (ref 26–34)
MCHC RBC AUTO-ENTMCNC: 31.9 G/DL (ref 32–36)
MCV RBC AUTO: 95 FL (ref 80–100)
MICROALBUMIN UR-MCNC: <7 MG/L
MICROALBUMIN/CREAT UR: NORMAL MG/G{CREAT}
NRBC BLD-RTO: 0 /100 WBCS (ref 0–0)
PHOSPHATE SERPL-MCNC: 3.4 MG/DL (ref 2.5–4.9)
PLATELET # BLD AUTO: 185 X10*3/UL (ref 150–450)
POTASSIUM SERPL-SCNC: 4.6 MMOL/L (ref 3.5–5.3)
RBC # BLD AUTO: 4.66 X10*6/UL (ref 4.5–5.9)
SODIUM SERPL-SCNC: 139 MMOL/L (ref 136–145)
WBC # BLD AUTO: 8.7 X10*3/UL (ref 4.4–11.3)

## 2024-03-14 PROCEDURE — 83970 ASSAY OF PARATHORMONE: CPT

## 2024-03-14 PROCEDURE — 82043 UR ALBUMIN QUANTITATIVE: CPT

## 2024-03-14 PROCEDURE — 36415 COLL VENOUS BLD VENIPUNCTURE: CPT

## 2024-03-14 PROCEDURE — 82570 ASSAY OF URINE CREATININE: CPT

## 2024-03-15 LAB — PTH-INTACT SERPL-MCNC: 89.1 PG/ML (ref 18.5–88)

## 2024-03-18 ENCOUNTER — LAB (OUTPATIENT)
Dept: LAB | Facility: LAB | Age: 84
End: 2024-03-18
Payer: MEDICARE

## 2024-03-18 ENCOUNTER — OFFICE VISIT (OUTPATIENT)
Dept: PRIMARY CARE | Facility: CLINIC | Age: 84
End: 2024-03-18
Payer: MEDICARE

## 2024-03-18 VITALS
SYSTOLIC BLOOD PRESSURE: 125 MMHG | TEMPERATURE: 97.2 F | DIASTOLIC BLOOD PRESSURE: 70 MMHG | HEIGHT: 68 IN | OXYGEN SATURATION: 98 % | HEART RATE: 84 BPM | BODY MASS INDEX: 33.34 KG/M2 | WEIGHT: 220 LBS

## 2024-03-18 DIAGNOSIS — R60.9 EDEMA, UNSPECIFIED TYPE: ICD-10-CM

## 2024-03-18 DIAGNOSIS — N18.4 STAGE 4 CHRONIC KIDNEY DISEASE (MULTI): ICD-10-CM

## 2024-03-18 DIAGNOSIS — E55.9 VITAMIN D DEFICIENCY, UNSPECIFIED: ICD-10-CM

## 2024-03-18 DIAGNOSIS — I10 HYPERTENSION, UNCONTROLLED: ICD-10-CM

## 2024-03-18 DIAGNOSIS — E53.8 VITAMIN B12 DEFICIENCY: ICD-10-CM

## 2024-03-18 DIAGNOSIS — M1A.9XX0 CHRONIC GOUT WITHOUT TOPHUS, UNSPECIFIED CAUSE, UNSPECIFIED SITE: ICD-10-CM

## 2024-03-18 DIAGNOSIS — R53.83 OTHER FATIGUE: ICD-10-CM

## 2024-03-18 DIAGNOSIS — E78.00 HYPERCHOLESTEREMIA: ICD-10-CM

## 2024-03-18 DIAGNOSIS — Z00.00 ROUTINE GENERAL MEDICAL EXAMINATION AT HEALTH CARE FACILITY: Primary | ICD-10-CM

## 2024-03-18 DIAGNOSIS — Z13.89 SCREENING FOR MULTIPLE CONDITIONS: ICD-10-CM

## 2024-03-18 LAB
25(OH)D3 SERPL-MCNC: 58 NG/ML (ref 30–100)
CHOLEST SERPL-MCNC: 144 MG/DL (ref 0–199)
CHOLESTEROL/HDL RATIO: 4.4
HDLC SERPL-MCNC: 32.6 MG/DL
LDLC SERPL CALC-MCNC: 59 MG/DL
NON HDL CHOLESTEROL: 111 MG/DL (ref 0–149)
TRIGL SERPL-MCNC: 264 MG/DL (ref 0–149)
TSH SERPL-ACNC: 3.29 MIU/L (ref 0.44–3.98)
VIT B12 SERPL-MCNC: 1344 PG/ML (ref 211–911)
VLDL: 53 MG/DL (ref 0–40)

## 2024-03-18 PROCEDURE — 1159F MED LIST DOCD IN RCRD: CPT | Performed by: INTERNAL MEDICINE

## 2024-03-18 PROCEDURE — 99214 OFFICE O/P EST MOD 30 MIN: CPT | Performed by: INTERNAL MEDICINE

## 2024-03-18 PROCEDURE — 1036F TOBACCO NON-USER: CPT | Performed by: INTERNAL MEDICINE

## 2024-03-18 PROCEDURE — G0439 PPPS, SUBSEQ VISIT: HCPCS | Performed by: INTERNAL MEDICINE

## 2024-03-18 PROCEDURE — 36415 COLL VENOUS BLD VENIPUNCTURE: CPT

## 2024-03-18 PROCEDURE — 1160F RVW MEDS BY RX/DR IN RCRD: CPT | Performed by: INTERNAL MEDICINE

## 2024-03-18 PROCEDURE — 3078F DIAST BP <80 MM HG: CPT | Performed by: INTERNAL MEDICINE

## 2024-03-18 PROCEDURE — 82306 VITAMIN D 25 HYDROXY: CPT

## 2024-03-18 PROCEDURE — 1170F FXNL STATUS ASSESSED: CPT | Performed by: INTERNAL MEDICINE

## 2024-03-18 PROCEDURE — 82607 VITAMIN B-12: CPT

## 2024-03-18 PROCEDURE — 3074F SYST BP LT 130 MM HG: CPT | Performed by: INTERNAL MEDICINE

## 2024-03-18 RX ORDER — FUROSEMIDE 20 MG/1
20 TABLET ORAL DAILY
Qty: 90 TABLET | Refills: 1 | Status: SHIPPED | OUTPATIENT
Start: 2024-03-18 | End: 2024-09-14

## 2024-03-18 RX ORDER — LISINOPRIL 40 MG/1
40 TABLET ORAL DAILY
Qty: 90 TABLET | Refills: 1 | Status: SHIPPED | OUTPATIENT
Start: 2024-03-18

## 2024-03-18 ASSESSMENT — PATIENT HEALTH QUESTIONNAIRE - PHQ9
1. LITTLE INTEREST OR PLEASURE IN DOING THINGS: NOT AT ALL
SUM OF ALL RESPONSES TO PHQ9 QUESTIONS 1 AND 2: 0
2. FEELING DOWN, DEPRESSED OR HOPELESS: NOT AT ALL

## 2024-03-18 ASSESSMENT — ENCOUNTER SYMPTOMS
DIZZINESS: 0
ABDOMINAL PAIN: 0
BRUISES/BLEEDS EASILY: 0
UNEXPECTED WEIGHT CHANGE: 0
SINUS PAIN: 0
COUGH: 0
HEADACHES: 0
WHEEZING: 0
DIARRHEA: 0
FEVER: 0
FATIGUE: 0
BLOOD IN STOOL: 0
SORE THROAT: 0
ARTHRALGIAS: 0
DIFFICULTY URINATING: 0
PALPITATIONS: 0

## 2024-03-18 ASSESSMENT — ACTIVITIES OF DAILY LIVING (ADL)
MANAGING_FINANCES: INDEPENDENT
GROCERY_SHOPPING: INDEPENDENT
BATHING: INDEPENDENT
TAKING_MEDICATION: INDEPENDENT
DOING_HOUSEWORK: INDEPENDENT
DRESSING: INDEPENDENT

## 2024-03-18 NOTE — PROGRESS NOTES
"Subjective   Patient ID: Cesar Wood is a 83 y.o. male who presents for Medicare Annual Wellness Visit Subsequent and Results (BW).    HPI       Review of Systems    Objective   No results found for: \"HGBA1C\"   /72   Pulse 84   Temp 36.2 °C (97.2 °F)   Ht 1.727 m (5' 8\")   Wt 99.8 kg (220 lb)   SpO2 98%   BMI 33.45 kg/m²   Lab Results   Component Value Date    WBC 8.7 03/14/2024    HGB 14.1 03/14/2024    HCT 44.2 03/14/2024     03/14/2024    CHOL 127 01/30/2023    TRIG 177 (H) 01/30/2023    HDL 39.0 (A) 01/30/2023    ALT 13 01/30/2023    AST 12 01/30/2023     03/14/2024    K 4.6 03/14/2024     03/14/2024    CREATININE 2.41 (H) 03/14/2024    BUN 54 (H) 03/14/2024    CO2 25 03/14/2024    TSH 2.81 01/30/2023    PSA 0.57 10/26/2017     par   Physical Exam    Assessment/Plan   Cesar was seen today for medicare annual wellness visit subsequent and results.  Diagnoses and all orders for this visit:  Edema, unspecified type  Hypertension, uncontrolled  Other orders  -     Follow Up In Primary Care - Health Maintenance     "

## 2024-03-18 NOTE — PROGRESS NOTES
Subjective   Reason for Visit: Cesar Wood is an 83 y.o. male here for a Medicare Wellness visit.     Past Medical, Surgical, and Family History reviewed and updated in chart.    Reviewed all medications by prescribing practitioner or clinical pharmacist (such as prescriptions, OTCs, herbal therapies and supplements) and documented in the medical record.    Annual Medicare physical  -Vaccinations reviewed and up-to-date  -Screen for colon cancer obtained no need to repeat because of patient age  - Screening for depression  Advance of directive reviewed  Recent renal function reviewed  - Continue with current medication  Proceed with fasting lipid thyroid function test vitamin D and vitamin B12    Follow-up  -Chronic gout controlled continues allopurinol no recurrence  - BPH continue with tamsulosin symptoms are controlled  - Chronic leg swelling improved continue with Lasix 20 mg daily continue low-salt diet continue with current medication  - Chronic kidney disease and hyperkalemia last potassium 4.6 follow-up nephrology as recommended scheduled today  Continue on Veltassa  - Hypothyroid controlled continue levothyroxine follow-up thyroid function test in 6 months  - Hypertension controlled continue with carvedilol lisinopril as recommended  - Chronic kidney disease continue low-salt avoid any NSAID follow-up nephrology DR MOSER  - Hypertension doing very well controlled continue with current current medication  -Next months        Patient Care Team:  Lizzie Quintero MD as PCP - General (Internal Medicine)  Lizzie Quintero MD as PCP - [facility] Attributed Provider  Lizzie Quintero MD     Review of Systems   Constitutional:  Negative for fatigue, fever and unexpected weight change.   HENT:  Negative for congestion, ear discharge, ear pain, mouth sores, sinus pain and sore throat.    Eyes:  Negative for visual disturbance.   Respiratory:  Negative for cough and wheezing.    Cardiovascular:  Negative for  "chest pain, palpitations and leg swelling.   Gastrointestinal:  Negative for abdominal pain, blood in stool and diarrhea.   Genitourinary:  Negative for difficulty urinating.   Musculoskeletal:  Negative for arthralgias.   Skin:  Negative for rash.   Neurological:  Negative for dizziness and headaches.   Hematological:  Does not bruise/bleed easily.   Psychiatric/Behavioral:  Negative for behavioral problems.    All other systems reviewed and are negative.      Objective   Vitals:  /70   Pulse 84   Temp 36.2 °C (97.2 °F)   Ht 1.727 m (5' 8\")   Wt 99.8 kg (220 lb)   SpO2 98%   BMI 33.45 kg/m²       Physical Exam  Vitals and nursing note reviewed.   Constitutional:       Appearance: Normal appearance.   HENT:      Head: Normocephalic.      Nose: Nose normal.   Eyes:      Conjunctiva/sclera: Conjunctivae normal.      Pupils: Pupils are equal, round, and reactive to light.   Cardiovascular:      Rate and Rhythm: Regular rhythm.   Pulmonary:      Effort: Pulmonary effort is normal.      Breath sounds: Normal breath sounds.   Abdominal:      General: Abdomen is flat.      Palpations: Abdomen is soft.   Musculoskeletal:      Cervical back: Neck supple.   Skin:     General: Skin is warm.   Neurological:      General: No focal deficit present.      Mental Status: He is oriented to person, place, and time.   Psychiatric:         Mood and Affect: Mood normal.         Assessment/Plan   Problem List Items Addressed This Visit       Gout    Hypertension, uncontrolled    Relevant Medications    lisinopril 40 mg tablet    Stage 4 chronic kidney disease (CMS/HCC)     Other Visit Diagnoses       Routine general medical examination at health care facility    -  Primary    Edema, unspecified type        Relevant Medications    furosemide (Lasix) 20 mg tablet    Screening for multiple conditions        Other fatigue        Relevant Orders    TSH with reflex to Free T4 if abnormal    Vitamin B12 deficiency        Relevant " Orders    Vitamin B12    Vitamin D deficiency, unspecified        Relevant Orders    Vitamin D 25-Hydroxy,Total (for eval of Vitamin D levels)    Hypercholesteremia        Relevant Orders    Lipid Panel        Annual Medicare physical  -Vaccinations reviewed and up-to-date  -Screen for colon cancer obtained no need to repeat because of patient age  - Screening for depression  Advance of directive reviewed  Recent renal function reviewed  - Continue with current medication  Proceed with fasting lipid thyroid function test vitamin D and vitamin B12    Follow-up  -Chronic gout controlled continues allopurinol no recurrence  - BPH continue with tamsulosin symptoms are controlled  - Chronic leg swelling improved continue with Lasix 20 mg daily continue low-salt diet continue with current medication  - Chronic kidney disease and hyperkalemia last potassium 4.6 follow-up nephrology as recommended scheduled today  Continue on Veltassa  - Hypothyroid controlled continue levothyroxine follow-up thyroid function test in 6 months  - Hypertension controlled continue with carvedilol lisinopril as recommended  - Chronic kidney disease continue low-salt avoid any NSAID follow-up nephrology DR MOSER  - Hypertension doing very well controlled continue with current current medication  -Next months

## 2024-03-20 ENCOUNTER — OFFICE VISIT (OUTPATIENT)
Dept: NEPHROLOGY | Facility: CLINIC | Age: 84
End: 2024-03-20
Payer: MEDICARE

## 2024-03-20 VITALS
DIASTOLIC BLOOD PRESSURE: 76 MMHG | TEMPERATURE: 97.5 F | RESPIRATION RATE: 16 BRPM | OXYGEN SATURATION: 99 % | HEART RATE: 87 BPM | WEIGHT: 220.6 LBS | SYSTOLIC BLOOD PRESSURE: 143 MMHG | HEIGHT: 70 IN | BODY MASS INDEX: 31.58 KG/M2

## 2024-03-20 DIAGNOSIS — N18.32 STAGE 3B CHRONIC KIDNEY DISEASE (MULTI): ICD-10-CM

## 2024-03-20 DIAGNOSIS — N18.4 STAGE 4 CHRONIC KIDNEY DISEASE (MULTI): Primary | ICD-10-CM

## 2024-03-20 DIAGNOSIS — N18.4 CHRONIC KIDNEY DISEASE, STAGE IV (SEVERE) (MULTI): ICD-10-CM

## 2024-03-20 DIAGNOSIS — M10.00 IDIOPATHIC GOUT, UNSPECIFIED CHRONICITY, UNSPECIFIED SITE: ICD-10-CM

## 2024-03-20 DIAGNOSIS — N40.0 ENLARGED PROSTATE WITHOUT LOWER URINARY TRACT SYMPTOMS (LUTS): ICD-10-CM

## 2024-03-20 DIAGNOSIS — I10 HYPERTENSION, UNCONTROLLED: ICD-10-CM

## 2024-03-20 PROCEDURE — 99215 OFFICE O/P EST HI 40 MIN: CPT | Performed by: INTERNAL MEDICINE

## 2024-03-20 PROCEDURE — 1159F MED LIST DOCD IN RCRD: CPT | Performed by: INTERNAL MEDICINE

## 2024-03-20 PROCEDURE — 1036F TOBACCO NON-USER: CPT | Performed by: INTERNAL MEDICINE

## 2024-03-20 PROCEDURE — 3078F DIAST BP <80 MM HG: CPT | Performed by: INTERNAL MEDICINE

## 2024-03-20 PROCEDURE — 3075F SYST BP GE 130 - 139MM HG: CPT | Performed by: INTERNAL MEDICINE

## 2024-03-20 PROCEDURE — 1160F RVW MEDS BY RX/DR IN RCRD: CPT | Performed by: INTERNAL MEDICINE

## 2024-03-20 NOTE — PROGRESS NOTES
Subjective       Cesar Wood is a 83 y.o. male who has past medical history of BPH, gout, hypertension, hypothyroidism was coming to see me today for CKD management follow-up    Last office visit September 2023.  In the interim, no recent gout flares.  No hospital admissions.  Cesar came alone today.  He reports frequent urination due to diuretics.  He is wondering if he should just stop-encouraged to continue diuretics for history of hypertension and leg swelling.  He had blood work done recently that showed slightly worsening serum creatinine 2.4 from his baseline 1.8, GFR dropped to 26 down from his baseline 30-35.  No significant NSAID use at home.  No albuminuria.  No anemia.  Normal electrolytes.  Accepted volume status.  Accepted blood pressure at home average reading 120-130/80.  He reports possible urine retention, frequent urination, incomplete bladder emptying.  Agreeable to get kidney ultrasound    Prior notes     Patient presents today for follow up on chronic kidney disease. Cesar came alone today, his last office visit was March 2023. All lab work discussed with patient today, including GFR 32 and SCr 2.02 which is stable and within his baseline. We will continue to monitor. Patient previously had elevated potassium but it is now within normal range, he is taking Veltassa. He expressed concern about cost of medication and we agreed to a trial of stopping the medication, we will recheck potassium in two months to evaluate levels. We discussed low potassium diet. Patient's blood pressure is under good control today, continue Lisinopril. Patient's uric acid is elevated today at 8.0, he reports no symptoms of gout, he will continue Allopurinol once a day and limit high purine foods. Patient reports he had incidences of ankle edema and PCP stopped amlodipine and started Lasix 20 mg and he reports no recurrences since then.        Per Prior Notes      Cesar was evaluated virtually today. Last office  "visit August 2023. In interim, repeat blood work showed hyperkalemia 5.8. He started on Veltassa/potassium binder. Repeat blood work in February 2023 showed normal potassium 4.4 and stable serum creatinine 1.8 in his baseline GFR 37. Within normal electrolytes. He has no complaints or concerns today. He suffers from occasional constipation while on Veltassa-instructed to use MiraLAX as a stool softener and increase fresh fruits and vegetables. No lower urinary tract symptoms. Blood pressure slightly evaded today 150/82 as he check blood pressure after exerting himself. Baseline blood pressure 120-125/80. Overall he is stable        Mr. Wood was evaluated virtually today. He feels well. He is in his baseline state of health. No lower urinary tract symptoms. Blood pressure is in target at home. He did blood work few weeks ago and all results were discussed with him today in details including improved serum creatinine GFR up to 39 within normal electrolytes no albuminuria. He is adherent to medications and low-salt diet.     Her prior notes     Cesar came alone today. He is aware of history of chronic kidney disease. He reports in 2015 got very sick due to bacteremia and E. coli. He developed acute kidney injury at that time. Kidney function improved after receiving antibiotics. He was left with some degree of chronic kidney disease after acute kidney injury fortunately resolved. He reports good urine output. No [place] tract symptoms. No leg swelling or shortness of breath. He feels in his baseline state of health. He used to follow with nephrology in the past and he lost follow-up. No NSAID use. 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  Surgical history: Had lung surgery in the past       Objective   /76 (BP Location: Left arm, Patient Position: Standing, BP Cuff Size: Adult)   Pulse 87   Temp 36.4 °C (97.5 °F)   Resp 16   Ht 1.765 m (5' 9.5\")   Wt 100 kg (220 " lb 9.6 oz)   SpO2 99%   BMI 32.11 kg/m²   Wt Readings from Last 3 Encounters:   03/20/24 100 kg (220 lb 9.6 oz)   03/18/24 99.8 kg (220 lb)   09/14/23 100 kg (220 lb 9.6 oz)       Physical Exam    General appearance: no distress awake and alert on room air, no significant hypervolemia in exam  Eyes: non-icteric  HEENT: atrumatic head, PEERLA, moist mucosa  Skin: no apparent rash  Heart: NSR, S1, S2 normal, no murmur or gallop  Lungs: Symmetrical expansion,CTA bilat no wheezing/crackles  Abdomen: soft, nt/nd, obese  Extremities: no edema bilat  Neuro: No FND,asterixis, no focal deficits noticed        Review of Systems     Constitutional: no fever, no chills, no recent weight gain and no recent weight loss.   Eyes: no blurred vision and no diplopia.   ENT: no hearing loss, no earache, no sore throat, no swollen glands in the neck and no nasal discharge.   Cardiovascular: no chest pain, no palpitations and no lower extremity edema.   Respiratory: no shortness of breath, no chronic cough and no shortness of breath during exertion.   Gastrointestinal: no abdominal pain, no constipation, no heartburn, no vomiting, no bloody stools and no change in bowel movements.   Genitourinary frequent urination, nocturia-no dysuria and no hematuria.   Musculoskeletal: no arthralgias and no myalgias.   Skin: no rashes and no skin lesions.   Neurological: no headaches and no dizziness.   Psychiatric: no confusion, no depression and no anxiety.   Endocrine: no heat intolerance, no cold intolerance, appetite not increased, no thyroid disorder, no increased urinary frequency and no dry skin.   Hematologic/Lymphatic: does not bleed easily and does not bruise easily.   All other systems have been reviewed and are negative for complaint.         Data Review        Results from last 7 days   Lab Units 03/14/24  0753   WBC AUTO x10*3/uL 8.7   HEMOGLOBIN g/dL 14.1   HEMATOCRIT % 44.2   PLATELETS AUTO x10*3/uL 185            Lab Results  "  Component Value Date    URICACID 8.0 (H) 09/07/2023           No results found for: \"HGBA1C\"        Results from last 7 days   Lab Units 03/14/24  0753   SODIUM mmol/L 139   POTASSIUM mmol/L 4.6   CHLORIDE mmol/L 104   CO2 mmol/L 25   BUN mg/dL 54*   GLUCOSE mg/dL 123*   CALCIUM mg/dL 9.6   ANION GAP mmol/L 15   EGFR mL/min/1.73m*2 26*           Albumin/Creatine Ratio   Date Value Ref Range Status   03/14/2024   Final     Comment:     One or more analytes used in this calculation is outside of the analytical measurement range. Calculation cannot be performed.   09/07/2023 SEE COMMENT 0.0 - 30.0 ug/mg crt Final     Comment:     One or more analytes used in this calculation   is outside of the analytical measurement range.  Calculation cannot be performed.     01/30/2023 SEE COMMENT 0.0 - 30.0 ug/mg crt Final     Comment:     One or more analytes used in this calculation   is outside of the analytical measurement range.  Calculation cannot be performed.              RFP  Recent Labs     03/14/24  0753 11/14/23  0827 09/07/23  0743 02/08/23  1102 01/31/23  1136 01/30/23  1004 08/08/22  0910 02/17/22  1033 12/02/19  0720 12/01/18  0730     --  139 139 138 140  137 138 136   < > 139   K 4.6 4.3 4.1 4.4 5.8* 5.9*  6.0* 4.8 4.8   < > 4.2     --  105 108* 104 108*  107 106 107   < > 105   CO2 25  --  27 22 26 23  25 24 25   < > 26   BUN 54*  --  45* 47* 45* 49*  49* 42* 43*   < > 36*   CREATININE 2.41*  --  2.02* 1.80* 1.88* 1.84*  1.84* 1.72* 1.91*   < > 1.62*   GLUCOSE 123*  --  123* 77 97 152*  153* 133* 109*   < > 112*   CALCIUM 9.6  --  9.2 9.5 9.1 9.3  9.0 9.2 9.1   < > 9.2   PHOS 3.4  --  2.2* 2.5 3.5 2.6 2.8  --   --  2.4*   EGFR 26*  --   --   --   --   --   --   --   --   --    ANIONGAP 15  --  11 13 14 15  11 13 9*   < > 12    < > = values in this interval not displayed.        Urineanalysis  Recent Labs     08/08/22  0910   COLORU YELLOW   APPEARANCEU HAZY   SPECGRAVU 1.025   SHAWN 5.5 "   PROTUR NEGATIVE   GLUCOSEU NEGATIVE   BLOODU NEGATIVE   KETONESU NEGATIVE   BILIRUBINU NEGATIVE   NITRITEU NEGATIVE   LEUKOCYTESU NEGATIVE       Urine Electrolytes  Recent Labs     03/14/24  0753 09/07/23  0743 01/30/23  1004 08/08/22  0910   CREATU 40.4 123.0 110.0 138.0   PROTUR  --   --   --  NEGATIVE   ALBUMINUR <7.0  --   --   --    MICROALBCREA  --  SEE COMMENT SEE COMMENT SEE COMMENT        Urine Micro  Recent Labs     12/01/18  0725   WBCU <1*   RBCU 1*   SQUAMEPIU <1   MUCUSU FEW        Iron  Recent Labs     09/07/23  0743   IRON 67   TIBC 236*   IRONSAT 28   FERRITIN 576*          Current Outpatient Medications on File Prior to Visit   Medication Sig Dispense Refill    allopurinol (Zyloprim) 100 mg tablet TAKE ONE TABLET BY MOUTH ONCE DAILY 90 tablet 0    aspirin 81 mg EC tablet Take 1 tablet (81 mg) by mouth once daily.      carvedilol (Coreg) 25 mg tablet TAKE ONE TABLET BY MOUTH TWICE A DAY WITH MEALS 180 tablet 0    finasteride (Proscar) 5 mg tablet TAKE ONE TABLET BY MOUTH ONCE DAILY *DO NOT CRUSH, CHEW, OR SPLIT* 90 tablet 0    furosemide (Lasix) 20 mg tablet Take 1 tablet (20 mg) by mouth once daily. 90 tablet 1    levothyroxine (Synthroid, Levoxyl) 50 mcg tablet TAKE ONE TABLET BY MOUTH ONCE DAILY 90 tablet 0    lisinopril 40 mg tablet Take 1 tablet (40 mg) by mouth once daily. 90 tablet 1    pravastatin (Pravachol) 20 mg tablet TAKE ONE TABLET BY MOUTH ONCE DAILY 90 tablet 0    tamsulosin (Flomax) 0.4 mg 24 hr capsule TAKE ONE CAPSULE BY MOUTH EVERY DAY 30 MINUTES AFTER THE SAME MEAL EACH DAY 90 capsule 0    Veltassa 16.8 gram powder in packet Take 16.8 g by mouth once daily.      [DISCONTINUED] furosemide (Lasix) 20 mg tablet Take 1 tablet (20 mg) by mouth once daily. 30 tablet 11    [DISCONTINUED] lisinopril 40 mg tablet TAKE ONE TABLET BY MOUTH DAILY 90 tablet 0     No current facility-administered medications on file prior to visit.           Assessment and Plan       Cesar Wood  is a  83 y.o. male who has past medical history of  BPH, gout, hypertension, hypothyroidism was coming to see me today for CKD management follow-up     Impression  #Chronic kidney disease stage III/A1. Most likely related to atherosclerotic cardiovascular disease  -Baseline serum creatinine 1.8-2, GFR 30-35 mils per minute per 1.73 mÂ². Today 26, serum creatinine 2.4 slightly worsening from prior.  Rule out urine retention.  Will get kidney ultrasound with postvoid bladder scan  -Hyperkalemia-normal now-off Veltassa.  Continue to potassium diet  -Kidney ultrasound done in December 2021 was reviewed and showed bilateral cysts otherwise unremarkable-repeat in the setting of worsening kidney function  -Urine dipstick earlier with no albuminuria.  Negative spot test ACR     #Hypertension-blood pressure is in good control. Current medication lisinopril 40 mg and carvedilol 25 mg, Lasix 20 mg. Continue same     #Ankle Edema -- PCP stopped Amlodipine and began Lasix 20 mg daily, no recurrences since then. Continue same     #BMD  -Within normal PTH, vitamin D, calcium     #No significant anemia     #Gout with no recent flare or symptoms-on allopurinol 100 mg, elevated uric acid level 8.0. Continue same, Will monitor      #CVS  -Continue statins        #BPH-positive lower urinary tract symptoms. Continue finasteride and tamsulosin.  Repeat kidney ultrasound     Follow-up in 3 months with repeat blood work and urinalysis prior to visi

## 2024-03-20 NOTE — PATIENT INSTRUCTIONS
Dear SERGEI   It was nice seeing you in the nephrology clinic today     Today we discussed the following:     #Chronic kidney disease stage III-stable 30-35%-worsening from prior now 26%.  I am worried about possible urinary retention.  Will get kidney ultrasound done     #Hypertension-your blood pressure is in good control. Recently stopped amlodipine for leg swelling-blood pressures okay     #High potassium-now normal     #Elevated uric acid-continue allopurinol 100 mg. No recent gout           Follow-up in 3 months with repeat blood work and urinalysis prior to the visit. We can do virtual visit if you prefer     Please call our office if you have any question  Thank you for coming to see me today     Too Augustine MD, MS, ELEN SANDOVAL  Clinical  - [employer]  Nephrologist - [facility] - [facility]     Slightly worsening from prior now down to 26%.  I am concerned about possible urinary retention

## 2024-05-28 DIAGNOSIS — E03.9 HYPOTHYROIDISM, UNSPECIFIED TYPE: ICD-10-CM

## 2024-05-28 RX ORDER — LEVOTHYROXINE SODIUM 50 UG/1
50 TABLET ORAL DAILY
Qty: 90 TABLET | Refills: 0 | Status: SHIPPED | OUTPATIENT
Start: 2024-05-28

## 2024-06-10 DIAGNOSIS — N18.4 STAGE 4 CHRONIC KIDNEY DISEASE (MULTI): ICD-10-CM

## 2024-06-10 DIAGNOSIS — E78.00 HYPERCHOLESTEROLEMIA: ICD-10-CM

## 2024-06-10 DIAGNOSIS — I10 HYPERTENSION, UNCONTROLLED: ICD-10-CM

## 2024-06-10 DIAGNOSIS — M1A.9XX0 CHRONIC GOUT WITHOUT TOPHUS, UNSPECIFIED CAUSE, UNSPECIFIED SITE: ICD-10-CM

## 2024-06-12 RX ORDER — ALLOPURINOL 100 MG/1
100 TABLET ORAL DAILY
Qty: 90 TABLET | Refills: 0 | Status: SHIPPED | OUTPATIENT
Start: 2024-06-12

## 2024-06-12 RX ORDER — PRAVASTATIN SODIUM 20 MG/1
20 TABLET ORAL DAILY
Qty: 90 TABLET | Refills: 0 | Status: SHIPPED | OUTPATIENT
Start: 2024-06-12

## 2024-06-12 RX ORDER — CARVEDILOL 25 MG/1
25 TABLET ORAL
Qty: 180 TABLET | Refills: 0 | Status: SHIPPED | OUTPATIENT
Start: 2024-06-12

## 2024-06-12 RX ORDER — TAMSULOSIN HYDROCHLORIDE 0.4 MG/1
CAPSULE ORAL
Qty: 90 CAPSULE | Refills: 0 | Status: SHIPPED | OUTPATIENT
Start: 2024-06-12

## 2024-06-12 RX ORDER — FINASTERIDE 5 MG/1
5 TABLET, FILM COATED ORAL DAILY
Qty: 90 TABLET | Refills: 0 | Status: SHIPPED | OUTPATIENT
Start: 2024-06-12

## 2024-06-20 ENCOUNTER — HOSPITAL ENCOUNTER (OUTPATIENT)
Dept: RADIOLOGY | Facility: HOSPITAL | Age: 84
Discharge: HOME | End: 2024-06-20
Payer: MEDICARE

## 2024-06-20 DIAGNOSIS — N18.4 CHRONIC KIDNEY DISEASE, STAGE IV (SEVERE) (MULTI): ICD-10-CM

## 2024-06-20 DIAGNOSIS — M10.00 IDIOPATHIC GOUT, UNSPECIFIED CHRONICITY, UNSPECIFIED SITE: ICD-10-CM

## 2024-06-20 DIAGNOSIS — N18.4 STAGE 4 CHRONIC KIDNEY DISEASE (MULTI): ICD-10-CM

## 2024-06-20 DIAGNOSIS — N40.0 ENLARGED PROSTATE WITHOUT LOWER URINARY TRACT SYMPTOMS (LUTS): ICD-10-CM

## 2024-06-20 DIAGNOSIS — I10 HYPERTENSION, UNCONTROLLED: ICD-10-CM

## 2024-06-20 DIAGNOSIS — N18.32 STAGE 3B CHRONIC KIDNEY DISEASE (MULTI): ICD-10-CM

## 2024-06-20 PROCEDURE — 76770 US EXAM ABDO BACK WALL COMP: CPT

## 2024-07-05 ENCOUNTER — LAB (OUTPATIENT)
Dept: LAB | Facility: LAB | Age: 84
End: 2024-07-05
Payer: MEDICARE

## 2024-07-05 DIAGNOSIS — I10 HYPERTENSION, UNCONTROLLED: ICD-10-CM

## 2024-07-05 DIAGNOSIS — N40.0 ENLARGED PROSTATE WITHOUT LOWER URINARY TRACT SYMPTOMS (LUTS): ICD-10-CM

## 2024-07-05 DIAGNOSIS — N18.4 STAGE 4 CHRONIC KIDNEY DISEASE (MULTI): ICD-10-CM

## 2024-07-05 DIAGNOSIS — M10.00 IDIOPATHIC GOUT, UNSPECIFIED CHRONICITY, UNSPECIFIED SITE: ICD-10-CM

## 2024-07-05 DIAGNOSIS — N18.32 STAGE 3B CHRONIC KIDNEY DISEASE (MULTI): ICD-10-CM

## 2024-07-05 DIAGNOSIS — N18.4 CHRONIC KIDNEY DISEASE, STAGE IV (SEVERE) (MULTI): ICD-10-CM

## 2024-07-05 LAB
25(OH)D3 SERPL-MCNC: 61 NG/ML (ref 30–100)
ALBUMIN SERPL BCP-MCNC: 3.9 G/DL (ref 3.4–5)
ANION GAP SERPL CALC-SCNC: 13 MMOL/L (ref 10–20)
BUN SERPL-MCNC: 69 MG/DL (ref 6–23)
CALCIUM SERPL-MCNC: 9.4 MG/DL (ref 8.6–10.3)
CHLORIDE SERPL-SCNC: 105 MMOL/L (ref 98–107)
CO2 SERPL-SCNC: 25 MMOL/L (ref 21–32)
CREAT SERPL-MCNC: 2.85 MG/DL (ref 0.5–1.3)
CREAT UR-MCNC: 39.7 MG/DL (ref 20–370)
EGFRCR SERPLBLD CKD-EPI 2021: 21 ML/MIN/1.73M*2
ERYTHROCYTE [DISTWIDTH] IN BLOOD BY AUTOMATED COUNT: 13.9 % (ref 11.5–14.5)
FERRITIN SERPL-MCNC: 606 NG/ML (ref 20–300)
GLUCOSE SERPL-MCNC: 88 MG/DL (ref 74–99)
HCT VFR BLD AUTO: 41 % (ref 41–52)
HGB BLD-MCNC: 13.3 G/DL (ref 13.5–17.5)
IRON SATN MFR SERPL: 25 % (ref 25–45)
IRON SERPL-MCNC: 61 UG/DL (ref 35–150)
MCH RBC QN AUTO: 30.4 PG (ref 26–34)
MCHC RBC AUTO-ENTMCNC: 32.4 G/DL (ref 32–36)
MCV RBC AUTO: 94 FL (ref 80–100)
MICROALBUMIN UR-MCNC: <7 MG/L
MICROALBUMIN/CREAT UR: NORMAL MG/G{CREAT}
NRBC BLD-RTO: 0 /100 WBCS (ref 0–0)
PHOSPHATE SERPL-MCNC: 4.1 MG/DL (ref 2.5–4.9)
PLATELET # BLD AUTO: 185 X10*3/UL (ref 150–450)
POTASSIUM SERPL-SCNC: 4.8 MMOL/L (ref 3.5–5.3)
PTH-INTACT SERPL-MCNC: 146.4 PG/ML (ref 18.5–88)
RBC # BLD AUTO: 4.38 X10*6/UL (ref 4.5–5.9)
SODIUM SERPL-SCNC: 138 MMOL/L (ref 136–145)
TIBC SERPL-MCNC: 241 UG/DL (ref 240–445)
UIBC SERPL-MCNC: 180 UG/DL (ref 110–370)
WBC # BLD AUTO: 8 X10*3/UL (ref 4.4–11.3)

## 2024-07-05 PROCEDURE — 36415 COLL VENOUS BLD VENIPUNCTURE: CPT

## 2024-07-05 PROCEDURE — 82570 ASSAY OF URINE CREATININE: CPT

## 2024-07-05 PROCEDURE — 82306 VITAMIN D 25 HYDROXY: CPT

## 2024-07-05 PROCEDURE — 83970 ASSAY OF PARATHORMONE: CPT

## 2024-07-05 PROCEDURE — 82043 UR ALBUMIN QUANTITATIVE: CPT

## 2024-07-08 ENCOUNTER — APPOINTMENT (OUTPATIENT)
Dept: NEPHROLOGY | Facility: CLINIC | Age: 84
End: 2024-07-08
Payer: MEDICARE

## 2024-07-08 VITALS
DIASTOLIC BLOOD PRESSURE: 67 MMHG | HEIGHT: 70 IN | SYSTOLIC BLOOD PRESSURE: 118 MMHG | BODY MASS INDEX: 31.38 KG/M2 | WEIGHT: 219.2 LBS

## 2024-07-08 DIAGNOSIS — N18.4 STAGE 4 CHRONIC KIDNEY DISEASE (MULTI): Primary | ICD-10-CM

## 2024-07-08 DIAGNOSIS — I10 HYPERTENSION, UNCONTROLLED: ICD-10-CM

## 2024-07-08 DIAGNOSIS — N40.0 ENLARGED PROSTATE WITHOUT LOWER URINARY TRACT SYMPTOMS (LUTS): ICD-10-CM

## 2024-07-08 DIAGNOSIS — M10.00 IDIOPATHIC GOUT, UNSPECIFIED CHRONICITY, UNSPECIFIED SITE: ICD-10-CM

## 2024-07-08 PROCEDURE — 3078F DIAST BP <80 MM HG: CPT | Performed by: INTERNAL MEDICINE

## 2024-07-08 PROCEDURE — 3074F SYST BP LT 130 MM HG: CPT | Performed by: INTERNAL MEDICINE

## 2024-07-08 PROCEDURE — 99214 OFFICE O/P EST MOD 30 MIN: CPT | Performed by: INTERNAL MEDICINE

## 2024-07-08 PROCEDURE — 1159F MED LIST DOCD IN RCRD: CPT | Performed by: INTERNAL MEDICINE

## 2024-07-08 RX ORDER — LISINOPRIL 10 MG/1
10 TABLET ORAL DAILY
Qty: 30 TABLET | Refills: 11 | Status: SHIPPED | OUTPATIENT
Start: 2024-07-08 | End: 2025-07-08

## 2024-07-08 NOTE — PROGRESS NOTES
Subjective       Cesar Wood is a 84 y.o. male who has past medical history of BPH, gout, hypertension, hypothyroidism was coming to see me today for CKD management follow-up    Cesar has a virtual visit today over the phone. He is aware that his kidney function has decreased. He has no specific kidney concerns or symptoms. We discussed how much fluid he should be drinking per day and he admits he does not drink enough but will increase it. We recommend he drinks 50 oz of fluid per day. He denies difficulties urinating, but states that sometimes he will urinate and have to urinate again right after. Discussed that we will plan to decrease his Lisinopril from 40 mg to 10 mg. Discussed that he may see his blood pressure increase slightly with SBPs to 130-140s which are okay. If SBP is consistently above 150, he should call the office.    Per prior note    Last office visit September 2023.  In the interim, no recent gout flares.  No hospital admissions.  Cesar came alone today.  He reports frequent urination due to diuretics.  He is wondering if he should just stop-encouraged to continue diuretics for history of hypertension and leg swelling.  He had blood work done recently that showed slightly worsening serum creatinine 2.4 from his baseline 1.8, GFR dropped to 26 down from his baseline 30-35.  No significant NSAID use at home.  No albuminuria.  No anemia.  Normal electrolytes.  Accepted volume status.  Accepted blood pressure at home average reading 120-130/80.  He reports possible urine retention, frequent urination, incomplete bladder emptying.  Agreeable to get kidney ultrasound    Prior notes     Patient presents today for follow up on chronic kidney disease. Cesar came alone today, his last office visit was March 2023. All lab work discussed with patient today, including GFR 32 and SCr 2.02 which is stable and within his baseline. We will continue to monitor. Patient previously had elevated potassium but it  is now within normal range, he is taking Veltassa. He expressed concern about cost of medication and we agreed to a trial of stopping the medication, we will recheck potassium in two months to evaluate levels. We discussed low potassium diet. Patient's blood pressure is under good control today, continue Lisinopril. Patient's uric acid is elevated today at 8.0, he reports no symptoms of gout, he will continue Allopurinol once a day and limit high purine foods. Patient reports he had incidences of ankle edema and PCP stopped amlodipine and started Lasix 20 mg and he reports no recurrences since then.        Per Prior Notes      Cesar was evaluated virtually today. Last office visit August 2023. In interim, repeat blood work showed hyperkalemia 5.8. He started on Veltassa/potassium binder. Repeat blood work in February 2023 showed normal potassium 4.4 and stable serum creatinine 1.8 in his baseline GFR 37. Within normal electrolytes. He has no complaints or concerns today. He suffers from occasional constipation while on Veltassa-instructed to use MiraLAX as a stool softener and increase fresh fruits and vegetables. No lower urinary tract symptoms. Blood pressure slightly evaded today 150/82 as he check blood pressure after exerting himself. Baseline blood pressure 120-125/80. Overall he is stable        Mr. Wood was evaluated virtually today. He feels well. He is in his baseline state of health. No lower urinary tract symptoms. Blood pressure is in target at home. He did blood work few weeks ago and all results were discussed with him today in details including improved serum creatinine GFR up to 39 within normal electrolytes no albuminuria. He is adherent to medications and low-salt diet.     Her prior notes     Cesar came alone today. He is aware of history of chronic kidney disease. He reports in 2015 got very sick due to bacteremia and E. coli. He developed acute kidney injury at that time. Kidney function  "improved after receiving antibiotics. He was left with some degree of chronic kidney disease after acute kidney injury fortunately resolved. He reports good urine output. No [place] tract symptoms. No leg swelling or shortness of breath. He feels in his baseline state of health. He used to follow with nephrology in the past and he lost follow-up. No NSAID use. 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  Surgical history: Had lung surgery in the past       Objective   /67   Ht 1.765 m (5' 9.5\")   Wt 99.4 kg (219 lb 3.2 oz)   BMI 31.91 kg/m²   Wt Readings from Last 3 Encounters:   07/08/24 99.4 kg (219 lb 3.2 oz)   03/20/24 100 kg (220 lb 9.6 oz)   03/18/24 99.8 kg (220 lb)       Physical Exam    Unable to perform today due to virtual visit    Prior physical exam below:    General appearance: no distress awake and alert on room air, no significant hypervolemia in exam  Eyes: non-icteric  HEENT: atrumatic head, PEERLA, moist mucosa  Skin: no apparent rash  Heart: NSR, S1, S2 normal, no murmur or gallop  Lungs: Symmetrical expansion,CTA bilat no wheezing/crackles  Abdomen: soft, nt/nd, obese  Extremities: no edema bilat  Neuro: No FND,asterixis, no focal deficits noticed        Review of Systems       Constitutional: no fever, no chills, no recent weight gain and no recent weight loss.   Eyes: no blurred vision and no diplopia.   ENT: no hearing loss, no earache, no sore throat, no swollen glands in the neck and no nasal discharge.   Cardiovascular: no chest pain, no palpitations and no lower extremity edema.   Respiratory: no shortness of breath, no chronic cough and no shortness of breath during exertion.   Gastrointestinal: no abdominal pain, no constipation, no heartburn, no vomiting, no bloody stools and no change in bowel movements.   Genitourinary frequent urination, nocturia-no dysuria and no hematuria.   Musculoskeletal: no arthralgias and no myalgias.   Skin: no " "rashes and no skin lesions.   Neurological: no headaches and no dizziness.   Psychiatric: no confusion, no depression and no anxiety.   Endocrine: no heat intolerance, no cold intolerance, appetite not increased, no thyroid disorder, no increased urinary frequency and no dry skin.   Hematologic/Lymphatic: does not bleed easily and does not bruise easily.   All other systems have been reviewed and are negative for complaint.         Data Review        Results from last 7 days   Lab Units 07/05/24  1131   WBC AUTO x10*3/uL 8.0   HEMOGLOBIN g/dL 13.3*   HEMATOCRIT % 41.0   PLATELETS AUTO x10*3/uL 185            Lab Results   Component Value Date    URICACID 8.0 (H) 09/07/2023           No results found for: \"HGBA1C\"        Results from last 7 days   Lab Units 07/05/24  1131   SODIUM mmol/L 138   POTASSIUM mmol/L 4.8   CHLORIDE mmol/L 105   CO2 mmol/L 25   BUN mg/dL 69*   GLUCOSE mg/dL 88   CALCIUM mg/dL 9.4   ANION GAP mmol/L 13   EGFR mL/min/1.73m*2 21*           Albumin/Creatinine Ratio   Date Value Ref Range Status   07/05/2024   Final     Comment:     One or more analytes used in this calculation is outside of the analytical measurement range. Calculation cannot be performed.   03/14/2024   Final     Comment:     One or more analytes used in this calculation is outside of the analytical measurement range. Calculation cannot be performed.     Albumin/Creatine Ratio   Date Value Ref Range Status   09/07/2023 SEE COMMENT 0.0 - 30.0 ug/mg crt Final     Comment:     One or more analytes used in this calculation   is outside of the analytical measurement range.  Calculation cannot be performed.              RFP  Recent Labs     07/05/24  1131 03/14/24  0753 11/14/23  0827 09/07/23  0743 02/08/23  1102 01/31/23  1136 01/30/23  1004 08/08/22  0910    139  --  139 139 138 140  137 138   K 4.8 4.6 4.3 4.1 4.4 5.8* 5.9*  6.0* 4.8    104  --  105 108* 104 108*  107 106   CO2 25 25  --  27 22 26 23  25 24   BUN " 69* 54*  --  45* 47* 45* 49*  49* 42*   CREATININE 2.85* 2.41*  --  2.02* 1.80* 1.88* 1.84*  1.84* 1.72*   GLUCOSE 88 123*  --  123* 77 97 152*  153* 133*   CALCIUM 9.4 9.6  --  9.2 9.5 9.1 9.3  9.0 9.2   PHOS 4.1 3.4  --  2.2* 2.5 3.5 2.6 2.8   EGFR 21* 26*  --   --   --   --   --   --    ANIONGAP 13 15  --  11 13 14 15  11 13        Urineanalysis  Recent Labs     08/08/22  0910   COLORU YELLOW   APPEARANCEU HAZY   SPECGRAVU 1.025   SHAWN 5.5   PROTUR NEGATIVE   GLUCOSEU NEGATIVE   BLOODU NEGATIVE   KETONESU NEGATIVE   BILIRUBINU NEGATIVE   NITRITEU NEGATIVE   LEUKOCYTESU NEGATIVE       Urine Electrolytes  Recent Labs     07/05/24  1137 03/14/24  0753 09/07/23  0743 01/30/23  1004 08/08/22  0910   CREATU 39.7 40.4 123.0 110.0 138.0   PROTUR  --   --   --   --  NEGATIVE   ALBUMINUR <7.0 <7.0  --   --   --    MICROALBCREA  --   --  SEE COMMENT SEE COMMENT SEE COMMENT        Urine Micro  Recent Labs     12/01/18  0725   WBCU <1*   RBCU 1*   SQUAMEPIU <1   MUCUSU FEW        Iron  Recent Labs     07/05/24  1131 09/07/23  0743   IRON 61 67   TIBC 241 236*   IRONSAT 25 28   FERRITIN 606* 576*          Current Outpatient Medications on File Prior to Visit   Medication Sig Dispense Refill    allopurinol (Zyloprim) 100 mg tablet TAKE ONE TABLET BY MOUTH ONCE DAILY 90 tablet 0    aspirin 81 mg EC tablet Take 1 tablet (81 mg) by mouth once daily.      carvedilol (Coreg) 25 mg tablet TAKE ONE TABLET BY MOUTH TWO TIMES A DAY WITH MEALS 180 tablet 0    finasteride (Proscar) 5 mg tablet TAKE ONE TABLET BY MOUTH ONCE DAILY. DO NOT CRUSH, CHEW, OR SPLIT. 90 tablet 0    furosemide (Lasix) 20 mg tablet Take 1 tablet (20 mg) by mouth once daily. 90 tablet 1    levothyroxine (Synthroid, Levoxyl) 50 mcg tablet TAKE ONE TABLET BY MOUTH once DAILY 90 tablet 0    pravastatin (Pravachol) 20 mg tablet TAKE ONE TABLET BY MOUTH ONCE DAILY 90 tablet 0    tamsulosin (Flomax) 0.4 mg 24 hr capsule TAKE ONE CAPSULE BY MOUTH EVERY DAY 30 MINUTES  AFTER THE SAME MEAL EACH DAY. 90 capsule 0    [DISCONTINUED] lisinopril 40 mg tablet Take 1 tablet (40 mg) by mouth once daily. 90 tablet 1    Veltassa 16.8 gram powder in packet Take 16.8 g by mouth once daily.       No current facility-administered medications on file prior to visit.           Assessment and Plan       Cesar Wood  is a 84 y.o. male who has past medical history of  BPH, gout, hypertension, hypothyroidism was coming to see me today for CKD management follow-up     Impression  #Chronic kidney disease stage III/A1. Most likely related to atherosclerotic cardiovascular disease  -Baseline serum creatinine 1.8-2, GFR 30-35 mils per minute per 1.73 mÂ². Today kidney function continues to worsen Scr 2.41 -->2.85 and GFR 26 --> 21   - Had kidney ultrasound with postvoid scan to rule out retention, but bladder was not full at the beginning of the exam, so difficult to rule-out retention. He currently takes flomax, continue.  -Kidney ultrasound shows bilateral cysts consistent with previous study in December 2021   -Hyperkalemia-normal now-off Veltassa.  Continue to potassium diet  -Kidney ultrasound done in December 2021 was reviewed and showed bilateral cysts otherwise unremarkable-repeat in the setting of worsening kidney function  -Urine dipstick earlier with no albuminuria.  Negative spot test ACR     #Hypertension-blood pressure is in good control. Current medication lisinopril 40 mg and carvedilol 25 mg, Lasix 20 mg.   -Plan to decrease Lisinopril to 10 mg in setting of worsening kidney function   -SBP to 130/140s is okay, but he has been instructed to call the office if SBP is consistently 150s or greater     #Ankle Edema -- PCP stopped Amlodipine and began Lasix 20 mg daily, no recurrences since then. Continue same     #BMD  -PTH at 146.4 July 2024  -vitamin D, calcium within normal limits      #No significant anemia  -Hgb 13.3 July 2024     #Gout with no recent flare or symptoms-on  allopurinol 100 mg, elevated uric acid level 8.0 September 2023. Continue same, Will monitor      #CVS  -Continue statins        #BPH-positive lower urinary tract symptoms. Continue finasteride and tamsulosin     Follow-up in 3 months with repeat blood work and urinalysis prior to visi

## 2024-07-08 NOTE — PATIENT INSTRUCTIONS
Dear SERGEI   It was nice seeing you in the nephrology clinic today     Today we discussed the following:     #Chronic kidney disease stage III-kidney function is worsening from 30 down to 26 and now down to 21%.  Kidney ultrasound did not show urinary retention.  I want you to drop lisinopril from 40 mg down to 10 mg.  Target blood pressure should be less than 150/90.  Please call my office if consistently above 150/90    #Hypertension-running less than 120/80s at home.  We will drop lisinopril from 40 down to 10 and continue to monitor blood pressure     #High potassium-now normal     #Elevated uric acid-continue allopurinol 100 mg. No recent gout    # Enlarged prostate-continue tamsulosin/Flomax        Follow-up in 3 months with repeat blood work and urinalysis prior to the visit. We can do virtual visit if you prefer     Please call our office if you have any question  Thank you for coming to see me today     Too Augustine MD, MS, ELEN SANDOVAL  Clinical  - [facility] [employer] [facility] [employer]  Nephrologist - [facility]

## 2024-07-30 ENCOUNTER — APPOINTMENT (OUTPATIENT)
Dept: NEPHROLOGY | Facility: CLINIC | Age: 84
End: 2024-07-30
Payer: MEDICARE

## 2024-08-24 DIAGNOSIS — R60.9 EDEMA, UNSPECIFIED TYPE: ICD-10-CM

## 2024-08-24 DIAGNOSIS — E03.9 HYPOTHYROIDISM, UNSPECIFIED TYPE: ICD-10-CM

## 2024-08-26 RX ORDER — LEVOTHYROXINE SODIUM 50 UG/1
50 TABLET ORAL DAILY
Qty: 90 TABLET | Refills: 0 | Status: SHIPPED | OUTPATIENT
Start: 2024-08-26

## 2024-08-26 RX ORDER — FUROSEMIDE 20 MG/1
20 TABLET ORAL DAILY
Qty: 90 TABLET | Refills: 0 | Status: SHIPPED | OUTPATIENT
Start: 2024-08-26

## 2024-09-18 ENCOUNTER — APPOINTMENT (OUTPATIENT)
Dept: PRIMARY CARE | Facility: CLINIC | Age: 84
End: 2024-09-18
Payer: MEDICARE

## 2024-09-18 VITALS
SYSTOLIC BLOOD PRESSURE: 130 MMHG | OXYGEN SATURATION: 97 % | DIASTOLIC BLOOD PRESSURE: 64 MMHG | TEMPERATURE: 96.7 F | BODY MASS INDEX: 31.79 KG/M2 | WEIGHT: 218.4 LBS | HEART RATE: 73 BPM

## 2024-09-18 DIAGNOSIS — E55.9 VITAMIN D DEFICIENCY, UNSPECIFIED: ICD-10-CM

## 2024-09-18 DIAGNOSIS — M1A.9XX0 CHRONIC GOUT WITHOUT TOPHUS, UNSPECIFIED CAUSE, UNSPECIFIED SITE: ICD-10-CM

## 2024-09-18 DIAGNOSIS — Z79.899 HIGH RISK MEDICATION USE: Primary | ICD-10-CM

## 2024-09-18 DIAGNOSIS — E78.00 HYPERCHOLESTEREMIA: ICD-10-CM

## 2024-09-18 DIAGNOSIS — E53.8 VITAMIN B12 DEFICIENCY: ICD-10-CM

## 2024-09-18 DIAGNOSIS — Z23 IMMUNIZATION DUE: ICD-10-CM

## 2024-09-18 DIAGNOSIS — I10 HYPERTENSION, UNCONTROLLED: ICD-10-CM

## 2024-09-18 DIAGNOSIS — E78.00 HYPERCHOLESTEROLEMIA: ICD-10-CM

## 2024-09-18 DIAGNOSIS — I10 HYPERTENSION, UNSPECIFIED TYPE: ICD-10-CM

## 2024-09-18 DIAGNOSIS — R53.83 OTHER FATIGUE: ICD-10-CM

## 2024-09-18 DIAGNOSIS — R60.9 EDEMA, UNSPECIFIED TYPE: ICD-10-CM

## 2024-09-18 DIAGNOSIS — N18.4 STAGE 4 CHRONIC KIDNEY DISEASE (MULTI): ICD-10-CM

## 2024-09-18 PROCEDURE — 1159F MED LIST DOCD IN RCRD: CPT | Performed by: INTERNAL MEDICINE

## 2024-09-18 PROCEDURE — 90662 IIV NO PRSV INCREASED AG IM: CPT | Performed by: INTERNAL MEDICINE

## 2024-09-18 PROCEDURE — 3075F SYST BP GE 130 - 139MM HG: CPT | Performed by: INTERNAL MEDICINE

## 2024-09-18 PROCEDURE — G0008 ADMIN INFLUENZA VIRUS VAC: HCPCS | Performed by: INTERNAL MEDICINE

## 2024-09-18 PROCEDURE — 3078F DIAST BP <80 MM HG: CPT | Performed by: INTERNAL MEDICINE

## 2024-09-18 PROCEDURE — 1160F RVW MEDS BY RX/DR IN RCRD: CPT | Performed by: INTERNAL MEDICINE

## 2024-09-18 PROCEDURE — 99214 OFFICE O/P EST MOD 30 MIN: CPT | Performed by: INTERNAL MEDICINE

## 2024-09-18 PROCEDURE — 1036F TOBACCO NON-USER: CPT | Performed by: INTERNAL MEDICINE

## 2024-09-18 RX ORDER — ALLOPURINOL 100 MG/1
100 TABLET ORAL DAILY
Qty: 90 TABLET | Refills: 1 | Status: SHIPPED | OUTPATIENT
Start: 2024-09-18

## 2024-09-18 RX ORDER — FINASTERIDE 5 MG/1
5 TABLET, FILM COATED ORAL DAILY
Qty: 90 TABLET | Refills: 1 | Status: SHIPPED | OUTPATIENT
Start: 2024-09-18

## 2024-09-18 RX ORDER — CARVEDILOL 25 MG/1
25 TABLET ORAL
Qty: 180 TABLET | Refills: 1 | Status: SHIPPED | OUTPATIENT
Start: 2024-09-18

## 2024-09-18 RX ORDER — PRAVASTATIN SODIUM 20 MG/1
20 TABLET ORAL DAILY
Qty: 90 TABLET | Refills: 1 | Status: SHIPPED | OUTPATIENT
Start: 2024-09-18

## 2024-09-18 RX ORDER — FUROSEMIDE 20 MG/1
20 TABLET ORAL DAILY
Qty: 90 TABLET | Refills: 1 | Status: SHIPPED | OUTPATIENT
Start: 2024-09-18

## 2024-09-18 RX ORDER — TAMSULOSIN HYDROCHLORIDE 0.4 MG/1
CAPSULE ORAL
Qty: 90 CAPSULE | Refills: 1 | Status: SHIPPED | OUTPATIENT
Start: 2024-09-18

## 2024-09-18 ASSESSMENT — ENCOUNTER SYMPTOMS
PALPITATIONS: 0
ABDOMINAL PAIN: 0
BRUISES/BLEEDS EASILY: 0
SORE THROAT: 0
UNEXPECTED WEIGHT CHANGE: 0
DIARRHEA: 0
HYPERTENSION: 1
BLOOD IN STOOL: 0
FATIGUE: 0
DIFFICULTY URINATING: 0
SINUS PAIN: 0
ARTHRALGIAS: 0
COUGH: 0
FEVER: 0
WHEEZING: 0
HEADACHES: 0
DIZZINESS: 0

## 2024-09-18 NOTE — PROGRESS NOTES
"Subjective   Patient ID: Cesar Wood is a 84 y.o. male who presents for Hyperlipidemia, Hypertension, Hypothyroidism, and Flu Vaccine (Flu given).    -Chronic kidney disease and hyperkalemia patient seen by nephrology lisinopril cut down to 10 mg improving follow-up BMP and potassium level  Continue Veltassa  Comes about 50 ounces of free water every day avoid any NSAIDs  - Chronic gout controlled continues allopurinol no recurrence  - BPH continue with tamsulosin symptoms are controlled  - Chronic leg swelling improved continue with Lasix 20 mg daily continue low-salt diet continue with current medication.  -- Hypothyroid controlled continue levothyroxine follow-up thyroid function test in 6 months  - Hypertension controlled continue with carvedilol lisinopril as recommended  -- Hypertension doing very well controlled continue with current current medication  Follow-up 6 months complete blood work and Medicare physical         Hyperlipidemia  Pertinent negatives include no chest pain.   Hypertension  Pertinent negatives include no chest pain, headaches or palpitations.          Review of Systems   Constitutional:  Negative for fatigue, fever and unexpected weight change.   HENT:  Negative for congestion, ear discharge, ear pain, mouth sores, sinus pain and sore throat.    Eyes:  Negative for visual disturbance.   Respiratory:  Negative for cough and wheezing.    Cardiovascular:  Negative for chest pain, palpitations and leg swelling.   Gastrointestinal:  Negative for abdominal pain, blood in stool and diarrhea.   Genitourinary:  Negative for difficulty urinating.   Musculoskeletal:  Negative for arthralgias.   Skin:  Negative for rash.   Neurological:  Negative for dizziness and headaches.   Hematological:  Does not bruise/bleed easily.   Psychiatric/Behavioral:  Negative for behavioral problems.    All other systems reviewed and are negative.      Objective   No results found for: \"HGBA1C\"   /64   " Pulse 73   Temp 35.9 °C (96.7 °F)   Wt 99.1 kg (218 lb 6.4 oz)   SpO2 97%   BMI 31.79 kg/m²     Physical Exam  Vitals and nursing note reviewed.   Constitutional:       Appearance: Normal appearance.   HENT:      Head: Normocephalic.      Nose: Nose normal.   Eyes:      Conjunctiva/sclera: Conjunctivae normal.      Pupils: Pupils are equal, round, and reactive to light.   Cardiovascular:      Rate and Rhythm: Regular rhythm.   Pulmonary:      Effort: Pulmonary effort is normal.      Breath sounds: Normal breath sounds.   Abdominal:      General: Abdomen is flat.      Palpations: Abdomen is soft.   Musculoskeletal:      Cervical back: Neck supple.   Skin:     General: Skin is warm.   Neurological:      General: No focal deficit present.      Mental Status: He is oriented to person, place, and time.   Psychiatric:         Mood and Affect: Mood normal.         Assessment/Plan   Cesar was seen today for hyperlipidemia, hypertension, hypothyroidism and flu vaccine.  Diagnoses and all orders for this visit:  High risk medication use (Primary)  -     CBC and Auto Differential; Future  Hypercholesterolemia  -     pravastatin (Pravachol) 20 mg tablet; Take 1 tablet (20 mg) by mouth once daily.  Hypertension, uncontrolled  -     carvedilol (Coreg) 25 mg tablet; Take 1 tablet (25 mg) by mouth 2 times daily (morning and late afternoon).  -     finasteride (Proscar) 5 mg tablet; Take 1 tablet (5 mg) by mouth once daily. DO NOT CRUSH CHEW OR SPLIT  Stage 4 chronic kidney disease (Multi)  -     tamsulosin (Flomax) 0.4 mg 24 hr capsule; TAKE ONE CAPSULE BY MOUTH EVERY DAY 30 MINUTES AFTER THE SAME MEAL EACH DAY  Chronic gout without tophus, unspecified cause, unspecified site  -     allopurinol (Zyloprim) 100 mg tablet; Take 1 tablet (100 mg) by mouth once daily.  Edema, unspecified type  -     furosemide (Lasix) 20 mg tablet; Take 1 tablet (20 mg) by mouth once daily.  Immunization due  -     Flu vaccine, trivalent,  preservative free, HIGH-DOSE, age 65y+ (Fluzone)  Hypertension, unspecified type  -     Comprehensive Metabolic Panel; Future  Hypercholesteremia  -     Lipid Panel; Future  Other fatigue  -     TSH with reflex to Free T4 if abnormal; Future  Vitamin B12 deficiency  -     Vitamin B12; Future  Vitamin D deficiency, unspecified  -     Vitamin D 25-Hydroxy,Total (for eval of Vitamin D levels); Future  Other orders  -     Follow Up In Primary Care - Established  -     Follow Up In Primary Care - Medicare Annual; Future   -Chronic kidney disease and hyperkalemia patient seen by nephrology lisinopril cut down to 10 mg improving follow-up BMP and potassium level  Continue Veltassa  Comes about 50 ounces of free water every day avoid any NSAIDs  - Chronic gout controlled continues allopurinol no recurrence  - BPH continue with tamsulosin symptoms are controlled  - Chronic leg swelling improved continue with Lasix 20 mg daily continue low-salt diet continue with current medication.  -- Hypothyroid controlled continue levothyroxine follow-up thyroid function test in 6 months  - Hypertension controlled continue with carvedilol lisinopril as recommended  -- Hypertension doing very well controlled continue with current current medication  Follow-up 6 months complete blood work and Medicare physical

## 2024-10-05 ENCOUNTER — LAB (OUTPATIENT)
Dept: LAB | Facility: LAB | Age: 84
End: 2024-10-05
Payer: MEDICARE

## 2024-10-05 DIAGNOSIS — M10.00 IDIOPATHIC GOUT, UNSPECIFIED CHRONICITY, UNSPECIFIED SITE: ICD-10-CM

## 2024-10-05 DIAGNOSIS — I10 HYPERTENSION, UNCONTROLLED: ICD-10-CM

## 2024-10-05 DIAGNOSIS — N18.4 STAGE 4 CHRONIC KIDNEY DISEASE (MULTI): ICD-10-CM

## 2024-10-05 DIAGNOSIS — N40.0 ENLARGED PROSTATE WITHOUT LOWER URINARY TRACT SYMPTOMS (LUTS): ICD-10-CM

## 2024-10-05 LAB
25(OH)D3 SERPL-MCNC: 56 NG/ML (ref 30–100)
ALBUMIN SERPL BCP-MCNC: 3.9 G/DL (ref 3.4–5)
ANION GAP SERPL CALC-SCNC: 12 MMOL/L (ref 10–20)
BUN SERPL-MCNC: 55 MG/DL (ref 6–23)
CALCIUM SERPL-MCNC: 8.9 MG/DL (ref 8.6–10.3)
CHLORIDE SERPL-SCNC: 105 MMOL/L (ref 98–107)
CO2 SERPL-SCNC: 24 MMOL/L (ref 21–32)
CREAT SERPL-MCNC: 1.99 MG/DL (ref 0.5–1.3)
EGFRCR SERPLBLD CKD-EPI 2021: 32 ML/MIN/1.73M*2
ERYTHROCYTE [DISTWIDTH] IN BLOOD BY AUTOMATED COUNT: 13.5 % (ref 11.5–14.5)
FERRITIN SERPL-MCNC: 561 NG/ML (ref 20–300)
GLUCOSE SERPL-MCNC: 111 MG/DL (ref 74–99)
HCT VFR BLD AUTO: 40.2 % (ref 41–52)
HGB BLD-MCNC: 13.3 G/DL (ref 13.5–17.5)
IRON SATN MFR SERPL: 27 % (ref 25–45)
IRON SERPL-MCNC: 66 UG/DL (ref 35–150)
MCH RBC QN AUTO: 30.5 PG (ref 26–34)
MCHC RBC AUTO-ENTMCNC: 33.1 G/DL (ref 32–36)
MCV RBC AUTO: 92 FL (ref 80–100)
NRBC BLD-RTO: 0 /100 WBCS (ref 0–0)
PHOSPHATE SERPL-MCNC: 2.8 MG/DL (ref 2.5–4.9)
PLATELET # BLD AUTO: 189 X10*3/UL (ref 150–450)
POTASSIUM SERPL-SCNC: 4.5 MMOL/L (ref 3.5–5.3)
PTH-INTACT SERPL-MCNC: 86.3 PG/ML (ref 18.5–88)
RBC # BLD AUTO: 4.36 X10*6/UL (ref 4.5–5.9)
SODIUM SERPL-SCNC: 136 MMOL/L (ref 136–145)
TIBC SERPL-MCNC: 244 UG/DL (ref 240–445)
UIBC SERPL-MCNC: 178 UG/DL (ref 110–370)
WBC # BLD AUTO: 7.5 X10*3/UL (ref 4.4–11.3)

## 2024-10-05 PROCEDURE — 83970 ASSAY OF PARATHORMONE: CPT

## 2024-10-05 PROCEDURE — 82306 VITAMIN D 25 HYDROXY: CPT

## 2024-10-05 PROCEDURE — 82570 ASSAY OF URINE CREATININE: CPT

## 2024-10-05 PROCEDURE — 36415 COLL VENOUS BLD VENIPUNCTURE: CPT

## 2024-10-05 PROCEDURE — 82043 UR ALBUMIN QUANTITATIVE: CPT

## 2024-10-06 LAB
CREAT UR-MCNC: 66.2 MG/DL (ref 20–370)
MICROALBUMIN UR-MCNC: <7 MG/L
MICROALBUMIN/CREAT UR: NORMAL MG/G{CREAT}

## 2024-10-10 ENCOUNTER — TELEMEDICINE (OUTPATIENT)
Dept: NEPHROLOGY | Facility: CLINIC | Age: 84
End: 2024-10-10
Payer: MEDICARE

## 2024-10-10 VITALS
DIASTOLIC BLOOD PRESSURE: 66 MMHG | BODY MASS INDEX: 31.21 KG/M2 | HEIGHT: 70 IN | SYSTOLIC BLOOD PRESSURE: 129 MMHG | WEIGHT: 218 LBS

## 2024-10-10 DIAGNOSIS — M10.00 IDIOPATHIC GOUT, UNSPECIFIED CHRONICITY, UNSPECIFIED SITE: ICD-10-CM

## 2024-10-10 DIAGNOSIS — N18.4 CHRONIC KIDNEY DISEASE, STAGE IV (SEVERE) (MULTI): Primary | ICD-10-CM

## 2024-10-10 DIAGNOSIS — I10 HYPERTENSION, UNCONTROLLED: ICD-10-CM

## 2024-10-10 DIAGNOSIS — N40.0 ENLARGED PROSTATE WITHOUT LOWER URINARY TRACT SYMPTOMS (LUTS): ICD-10-CM

## 2024-10-10 DIAGNOSIS — N18.4 STAGE 4 CHRONIC KIDNEY DISEASE (MULTI): ICD-10-CM

## 2024-10-10 DIAGNOSIS — N18.32 STAGE 3B CHRONIC KIDNEY DISEASE (MULTI): ICD-10-CM

## 2024-10-10 PROCEDURE — 1159F MED LIST DOCD IN RCRD: CPT | Performed by: INTERNAL MEDICINE

## 2024-10-10 PROCEDURE — 3074F SYST BP LT 130 MM HG: CPT | Performed by: INTERNAL MEDICINE

## 2024-10-10 PROCEDURE — 99214 OFFICE O/P EST MOD 30 MIN: CPT | Performed by: INTERNAL MEDICINE

## 2024-10-10 PROCEDURE — 3078F DIAST BP <80 MM HG: CPT | Performed by: INTERNAL MEDICINE

## 2024-10-10 ASSESSMENT — ENCOUNTER SYMPTOMS
FLANK PAIN: 0
FEVER: 0
NAUSEA: 0
ABDOMINAL PAIN: 0
VOMITING: 0
CHILLS: 0
DYSURIA: 0
FREQUENCY: 0
SHORTNESS OF BREATH: 0
DIFFICULTY URINATING: 0

## 2024-10-10 NOTE — PROGRESS NOTES
Subjective       Cesar Wood is a 84 y.o. male who has past medical history of BPH, gout, hypertension, hypothyroidism, and CKD presented to the clinic via virtual visit. Patient feels well overall. Last visit in May. He denies any acute distress. BP is acceptable today. We lowered his lisinopril from 40 to 10 mg last visit which appear to be holding his pressure. Kidney function is stable and GFR improved from 21 to 32 and Cr 2.85 to 1.99. his hyperkalemia have resolved previously and been off veltassa. No protein leak. Otherwise he is doing well. No fever, chills, CP, SOB, N/V, abd pain, urinary symptoms, dysuria, or hematuria.       Per Prior Note     Cesar has a virtual visit today over the phone. He is aware that his kidney function has decreased. He has no specific kidney concerns or symptoms. We discussed how much fluid he should be drinking per day and he admits he does not drink enough but will increase it. We recommend he drinks 50 oz of fluid per day. He denies difficulties urinating, but states that sometimes he will urinate and have to urinate again right after. Discussed that we will plan to decrease his Lisinopril from 40 mg to 10 mg. Discussed that he may see his blood pressure increase slightly with SBPs to 130-140s which are okay. If SBP is consistently above 150, he should call the office.    Per prior note    Last office visit September 2023.  In the interim, no recent gout flares.  No hospital admissions.  Cesar came alone today.  He reports frequent urination due to diuretics.  He is wondering if he should just stop-encouraged to continue diuretics for history of hypertension and leg swelling.  He had blood work done recently that showed slightly worsening serum creatinine 2.4 from his baseline 1.8, GFR dropped to 26 down from his baseline 30-35.  No significant NSAID use at home.  No albuminuria.  No anemia.  Normal electrolytes.  Accepted volume status.  Accepted blood pressure at home  average reading 120-130/80.  He reports possible urine retention, frequent urination, incomplete bladder emptying.  Agreeable to get kidney ultrasound    Prior notes     Patient presents today for follow up on chronic kidney disease. Cesar came alone today, his last office visit was March 2023. All lab work discussed with patient today, including GFR 32 and SCr 2.02 which is stable and within his baseline. We will continue to monitor. Patient previously had elevated potassium but it is now within normal range, he is taking Veltassa. He expressed concern about cost of medication and we agreed to a trial of stopping the medication, we will recheck potassium in two months to evaluate levels. We discussed low potassium diet. Patient's blood pressure is under good control today, continue Lisinopril. Patient's uric acid is elevated today at 8.0, he reports no symptoms of gout, he will continue Allopurinol once a day and limit high purine foods. Patient reports he had incidences of ankle edema and PCP stopped amlodipine and started Lasix 20 mg and he reports no recurrences since then.        Per Prior Notes      Cesar was evaluated virtually today. Last office visit August 2023. In interim, repeat blood work showed hyperkalemia 5.8. He started on Veltassa/potassium binder. Repeat blood work in February 2023 showed normal potassium 4.4 and stable serum creatinine 1.8 in his baseline GFR 37. Within normal electrolytes. He has no complaints or concerns today. He suffers from occasional constipation while on Veltassa-instructed to use MiraLAX as a stool softener and increase fresh fruits and vegetables. No lower urinary tract symptoms. Blood pressure slightly evaded today 150/82 as he check blood pressure after exerting himself. Baseline blood pressure 120-125/80. Overall he is stable        Mr. Wood was evaluated virtually today. He feels well. He is in his baseline state of health. No lower urinary tract symptoms. Blood  "pressure is in target at home. He did blood work few weeks ago and all results were discussed with him today in details including improved serum creatinine GFR up to 39 within normal electrolytes no albuminuria. He is adherent to medications and low-salt diet.     Her prior notes     Cesar came alone today. He is aware of history of chronic kidney disease. He reports in 2015 got very sick due to bacteremia and E. coli. He developed acute kidney injury at that time. Kidney function improved after receiving antibiotics. He was left with some degree of chronic kidney disease after acute kidney injury fortunately resolved. He reports good urine output. No Laur[place] tract symptoms. No leg swelling or shortness of breath. He feels in his baseline state of health. He used to follow with nephrology in the past and he lost follow-up. No NSAID use. 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  Surgical history: Had lung surgery in the past       Objective   /66   Ht 1.765 m (5' 9.5\")   Wt 98.9 kg (218 lb)   BMI 31.73 kg/m²   Wt Readings from Last 3 Encounters:   10/10/24 98.9 kg (218 lb)   09/18/24 99.1 kg (218 lb 6.4 oz)   07/08/24 99.4 kg (219 lb 3.2 oz)       Physical Exam  Constitutional:       General: He is not in acute distress.  Neurological:      Mental Status: He is oriented to person, place, and time.   Psychiatric:         Mood and Affect: Mood normal.         Thought Content: Thought content normal.         Judgment: Judgment normal.         Unable to perform today due to virtual visit      Review of Systems   Constitutional:  Negative for chills and fever.   Respiratory:  Negative for shortness of breath.    Cardiovascular:  Positive for leg swelling. Negative for chest pain.   Gastrointestinal:  Negative for abdominal pain, nausea and vomiting.   Genitourinary:  Negative for difficulty urinating, dysuria, flank pain and frequency.            Data Review        Results " "from last 7 days   Lab Units 10/05/24  0809   WBC AUTO x10*3/uL 7.5   HEMOGLOBIN g/dL 13.3*   HEMATOCRIT % 40.2*   PLATELETS AUTO x10*3/uL 189            Lab Results   Component Value Date    URICACID 8.0 (H) 09/07/2023           No results found for: \"HGBA1C\"        Results from last 7 days   Lab Units 10/05/24  0809   SODIUM mmol/L 136   POTASSIUM mmol/L 4.5   CHLORIDE mmol/L 105   CO2 mmol/L 24   BUN mg/dL 55*   GLUCOSE mg/dL 111*   CALCIUM mg/dL 8.9   ANION GAP mmol/L 12   EGFR mL/min/1.73m*2 32*           Albumin/Creatinine Ratio   Date Value Ref Range Status   10/05/2024   Final     Comment:     One or more analytes used in this calculation is outside of the analytical measurement range. Calculation cannot be performed.   07/05/2024   Final     Comment:     One or more analytes used in this calculation is outside of the analytical measurement range. Calculation cannot be performed.   03/14/2024   Final     Comment:     One or more analytes used in this calculation is outside of the analytical measurement range. Calculation cannot be performed.            RFP  Recent Labs     10/05/24  0809 07/05/24  1131 03/14/24  0753 11/14/23  0827 09/07/23  0743 02/08/23  1102 01/31/23  1136 01/30/23  1004    138 139  --  139 139 138 140  137   K 4.5 4.8 4.6 4.3 4.1 4.4 5.8* 5.9*  6.0*    105 104  --  105 108* 104 108*  107   CO2 24 25 25  --  27 22 26 23  25   BUN 55* 69* 54*  --  45* 47* 45* 49*  49*   CREATININE 1.99* 2.85* 2.41*  --  2.02* 1.80* 1.88* 1.84*  1.84*   GLUCOSE 111* 88 123*  --  123* 77 97 152*  153*   CALCIUM 8.9 9.4 9.6  --  9.2 9.5 9.1 9.3  9.0   PHOS 2.8 4.1 3.4  --  2.2* 2.5 3.5 2.6   EGFR 32* 21* 26*  --   --   --   --   --    ANIONGAP 12 13 15  --  11 13 14 15  11        Urineanalysis  Recent Labs     08/08/22  0910   COLORU YELLOW   APPEARANCEU HAZY   SPECGRAVU 1.025   SHAWN 5.5   PROTUR NEGATIVE   GLUCOSEU NEGATIVE   BLOODU NEGATIVE   KETONESU NEGATIVE   BILIRUBINU NEGATIVE "   NITRITEU NEGATIVE   LEUKOCYTESU NEGATIVE       Urine Electrolytes  Recent Labs     10/05/24  0851 07/05/24  1137 03/14/24  0753 09/07/23  0743 01/30/23  1004 08/08/22  0910   CREATU 66.2 39.7 40.4 123.0 110.0 138.0   PROTUR  --   --   --   --   --  NEGATIVE   ALBUMINUR <7.0 <7.0 <7.0  --   --   --    MICROALBCREA  --   --   --  SEE COMMENT SEE COMMENT SEE COMMENT        Urine Micro  Recent Labs     12/01/18  0725   WBCU <1*   RBCU 1*   SQUAMEPIU <1   MUCUSU FEW        Iron  Recent Labs     10/05/24  0809 07/05/24  1131 09/07/23  0743   IRON 66 61 67   TIBC 244 241 236*   IRONSAT 27 25 28   FERRITIN 561* 606* 576*          Current Outpatient Medications on File Prior to Visit   Medication Sig Dispense Refill    allopurinol (Zyloprim) 100 mg tablet Take 1 tablet (100 mg) by mouth once daily. 90 tablet 1    aspirin 81 mg EC tablet Take 1 tablet (81 mg) by mouth once daily.      carvedilol (Coreg) 25 mg tablet Take 1 tablet (25 mg) by mouth 2 times daily (morning and late afternoon). 180 tablet 1    finasteride (Proscar) 5 mg tablet Take 1 tablet (5 mg) by mouth once daily. DO NOT CRUSH CHEW OR SPLIT 90 tablet 1    furosemide (Lasix) 20 mg tablet Take 1 tablet (20 mg) by mouth once daily. 90 tablet 1    levothyroxine (Synthroid, Levoxyl) 50 mcg tablet TAKE ONE TABLET BY MOUTH ONCE DAILY 90 tablet 0    lisinopril 10 mg tablet Take 1 tablet (10 mg) by mouth once daily. 30 tablet 11    pravastatin (Pravachol) 20 mg tablet Take 1 tablet (20 mg) by mouth once daily. 90 tablet 1    tamsulosin (Flomax) 0.4 mg 24 hr capsule TAKE ONE CAPSULE BY MOUTH EVERY DAY 30 MINUTES AFTER THE SAME MEAL EACH DAY 90 capsule 1    Veltassa 16.8 gram powder in packet Take 16.8 g by mouth once daily.       No current facility-administered medications on file prior to visit.           Assessment and Plan       Cesar Wood is a 84 y.o. male who has past medical history of BPH, gout, hypertension, hypothyroidism, and CKD presented to the  clinic via virtual visit. Patient feels well overall. Last visit in May. He denies any acute distress. BP is acceptable today. We lowered his lisinopril from 40 to 10 mg last visit which appear to be holding his pressure. Kidney function is stable and GFR improved from 21 to 32 and Cr 2.85 to 1.99. his hyperkalemia have resolved in the past and been off veltassa. Otherwise he is doing well. No fever, chills, CP, SOB, N/V, abd pain, urinary symptoms, dysuria, or hematuria.          Impression  #Chronic kidney disease stage III/A1. Most likely related to atherosclerotic cardiovascular disease  -Baseline serum creatinine 1.8-2, GFR 30-35 mils per minute per 1.73 mÂ².   -Kidney function back to baseline improved significantly from previously to Cr 1.99 GFR 32 from Cr 2.85 GFR 32  -No protein leak.   - Had kidney ultrasound with postvoid scan to rule out retention, but bladder was not full at the beginning of the exam, so difficult to rule-out retention. He currently takes flomax, continue.  -Kidney ultrasound shows bilateral cysts consistent with previous study in December 2021   -Hyperkalemia-resolved from before. Been off Veltassa.  Continue low potassium diet  -Kidney ultrasound done in December 2021 was reviewed and showed bilateral cysts otherwise unremarkable-repeat in the setting of worsening kidney function      #Hypertension-blood pressure is in good control. Acceptable today 129/66 (checked at home)  -Lowered his lisinopril previously from 40 to 10 mg given sudden drop in kidney function. Kidney function significantly improved and BP holding with no protein leak. Will continue with 10mg lisinopril for now. If needed can increase in the future.   -Continue current medication lisinopril 10 mg, carvedilol 25 mg, and Lasix 20 mg.   -Advised to continue checking BP at home. If BP>150/90 to notify us.        #Ankle Edema -- PCP previously stopped Amlodipine and began Lasix 20 mg daily, no recurrences since then.  Continue same     #BMD  -PTH and VitD WNL       #No significant anemia  -Hgb 13.3 stable     #Gout with no recent flare or symptoms-on allopurinol 100 mg.  -Last check for uric acid level was 8 in September 2023   -Will repeat level for next visit.     #CVS  -Continue statins        #BPH-positive lower urinary tract symptoms. Continue finasteride and tamsulosin     Follow-up in 6 months with repeat blood work and urinalysis prior to visit.     Discussed with Dr. Augustine.     Augustine Villaseñor   PGY3 [facility]

## 2024-10-10 NOTE — PATIENT INSTRUCTIONS
Dear SERGEI   It was nice seeing you in the nephrology clinic today     Today we discussed the following:     #Chronic kidney disease stage III-kidney function is improving from 21 up to 32% after reducing lisinopril from 40 mg down to 10 mg-continue same    Continue to check blood pressure at home.  Target blood pressure should be less than 150/90.  Please call my office if consistently above 150/90    #Hypertension-accepted controlled.  Continue lisinopril 10 and furosemide 20 mg daily.  Continue to monitor blood pressure     #High potassium-now normal.  No need for potassium binder/Veltassa at this time as you already not taking it     #Elevated uric acid-continue allopurinol 100 mg. No recent gout    # Enlarged prostate-continue tamsulosin/Flomax        Follow-up in 6 months with repeat blood work and urinalysis prior to the visit. We can do virtual visit if you prefer     Please call our office if you have any question  Thank you for coming to see me today     Too Augustine MD, MS, ELEN SANDOVAL  Clinical  - [employer]  Nephrologist - [facility]

## 2024-11-17 DIAGNOSIS — E03.9 HYPOTHYROIDISM, UNSPECIFIED TYPE: ICD-10-CM

## 2024-11-18 RX ORDER — LEVOTHYROXINE SODIUM 50 UG/1
50 TABLET ORAL DAILY
Qty: 90 TABLET | Refills: 0 | Status: SHIPPED | OUTPATIENT
Start: 2024-11-18

## 2025-01-21 ENCOUNTER — APPOINTMENT (OUTPATIENT)
Dept: CARDIOLOGY | Facility: HOSPITAL | Age: 85
End: 2025-01-21
Payer: MEDICARE

## 2025-01-21 ENCOUNTER — APPOINTMENT (OUTPATIENT)
Dept: RADIOLOGY | Facility: HOSPITAL | Age: 85
End: 2025-01-21
Payer: MEDICARE

## 2025-01-21 ENCOUNTER — HOSPITAL ENCOUNTER (INPATIENT)
Facility: HOSPITAL | Age: 85
LOS: 3 days | Discharge: HOME | End: 2025-01-25
Attending: EMERGENCY MEDICINE | Admitting: INTERNAL MEDICINE
Payer: MEDICARE

## 2025-01-21 DIAGNOSIS — J18.9 PNEUMONIA DUE TO INFECTIOUS ORGANISM, UNSPECIFIED LATERALITY, UNSPECIFIED PART OF LUNG: ICD-10-CM

## 2025-01-21 DIAGNOSIS — J10.1 INFLUENZA A: ICD-10-CM

## 2025-01-21 DIAGNOSIS — R06.02 SHORTNESS OF BREATH: ICD-10-CM

## 2025-01-21 DIAGNOSIS — J18.9 PNEUMONIA OF RIGHT LUNG DUE TO INFECTIOUS ORGANISM, UNSPECIFIED PART OF LUNG: Primary | ICD-10-CM

## 2025-01-21 PROBLEM — N18.30 ACUTE RENAL FAILURE SUPERIMPOSED ON STAGE 3 CHRONIC KIDNEY DISEASE (MULTI): Status: ACTIVE | Noted: 2025-01-21

## 2025-01-21 PROBLEM — N17.9 ACUTE RENAL FAILURE SUPERIMPOSED ON STAGE 3 CHRONIC KIDNEY DISEASE (MULTI): Status: ACTIVE | Noted: 2025-01-21

## 2025-01-21 PROBLEM — J96.01 ACUTE HYPOXIC RESPIRATORY FAILURE (MULTI): Status: ACTIVE | Noted: 2025-01-21

## 2025-01-21 LAB
ALBUMIN SERPL BCP-MCNC: 3.6 G/DL (ref 3.4–5)
ALP SERPL-CCNC: 44 U/L (ref 33–136)
ALT SERPL W P-5'-P-CCNC: 14 U/L (ref 10–52)
ANION GAP SERPL CALC-SCNC: 16 MMOL/L (ref 10–20)
AST SERPL W P-5'-P-CCNC: 18 U/L (ref 9–39)
ATRIAL RATE: 89 BPM
BASOPHILS # BLD AUTO: 0.02 X10*3/UL (ref 0–0.1)
BASOPHILS NFR BLD AUTO: 0.2 %
BILIRUB SERPL-MCNC: 0.6 MG/DL (ref 0–1.2)
BNP SERPL-MCNC: 90 PG/ML (ref 0–99)
BUN SERPL-MCNC: 56 MG/DL (ref 6–23)
CALCIUM SERPL-MCNC: 8.7 MG/DL (ref 8.6–10.3)
CARDIAC TROPONIN I PNL SERPL HS: 12 NG/L (ref 0–20)
CHLORIDE SERPL-SCNC: 103 MMOL/L (ref 98–107)
CO2 SERPL-SCNC: 22 MMOL/L (ref 21–32)
CREAT SERPL-MCNC: 2.4 MG/DL (ref 0.5–1.3)
D DIMER PPP FEU-MCNC: 656 NG/ML FEU
EGFRCR SERPLBLD CKD-EPI 2021: 26 ML/MIN/1.73M*2
EOSINOPHIL # BLD AUTO: 0 X10*3/UL (ref 0–0.4)
EOSINOPHIL NFR BLD AUTO: 0 %
ERYTHROCYTE [DISTWIDTH] IN BLOOD BY AUTOMATED COUNT: 13.6 % (ref 11.5–14.5)
EST. AVERAGE GLUCOSE BLD GHB EST-MCNC: 128 MG/DL
FLUAV RNA RESP QL NAA+PROBE: DETECTED
FLUBV RNA RESP QL NAA+PROBE: NOT DETECTED
GLUCOSE SERPL-MCNC: 241 MG/DL (ref 74–99)
HBA1C MFR BLD: 6.1 %
HCT VFR BLD AUTO: 40.5 % (ref 41–52)
HGB BLD-MCNC: 13.5 G/DL (ref 13.5–17.5)
IMM GRANULOCYTES # BLD AUTO: 0.05 X10*3/UL (ref 0–0.5)
IMM GRANULOCYTES NFR BLD AUTO: 0.6 % (ref 0–0.9)
LYMPHOCYTES # BLD AUTO: 1.16 X10*3/UL (ref 0.8–3)
LYMPHOCYTES NFR BLD AUTO: 14.1 %
MAGNESIUM SERPL-MCNC: 1.91 MG/DL (ref 1.6–2.4)
MCH RBC QN AUTO: 30.3 PG (ref 26–34)
MCHC RBC AUTO-ENTMCNC: 33.3 G/DL (ref 32–36)
MCV RBC AUTO: 91 FL (ref 80–100)
MONOCYTES # BLD AUTO: 0.72 X10*3/UL (ref 0.05–0.8)
MONOCYTES NFR BLD AUTO: 8.7 %
NEUTROPHILS # BLD AUTO: 6.29 X10*3/UL (ref 1.6–5.5)
NEUTROPHILS NFR BLD AUTO: 76.4 %
NRBC BLD-RTO: 0 /100 WBCS (ref 0–0)
P AXIS: 42 DEGREES
P OFFSET: 190 MS
P ONSET: 132 MS
PLATELET # BLD AUTO: 173 X10*3/UL (ref 150–450)
POTASSIUM SERPL-SCNC: 5.1 MMOL/L (ref 3.5–5.3)
PR INTERVAL: 170 MS
PROT SERPL-MCNC: 7.3 G/DL (ref 6.4–8.2)
Q ONSET: 217 MS
QRS COUNT: 15 BEATS
QRS DURATION: 74 MS
QT INTERVAL: 336 MS
QTC CALCULATION(BAZETT): 408 MS
QTC FREDERICIA: 383 MS
R AXIS: 78 DEGREES
RBC # BLD AUTO: 4.46 X10*6/UL (ref 4.5–5.9)
RSV RNA RESP QL NAA+PROBE: NOT DETECTED
SARS-COV-2 RNA RESP QL NAA+PROBE: NOT DETECTED
SODIUM SERPL-SCNC: 136 MMOL/L (ref 136–145)
T AXIS: 59 DEGREES
T OFFSET: 385 MS
VENTRICULAR RATE: 89 BPM
WBC # BLD AUTO: 8.2 X10*3/UL (ref 4.4–11.3)

## 2025-01-21 PROCEDURE — 2500000002 HC RX 250 W HCPCS SELF ADMINISTERED DRUGS (ALT 637 FOR MEDICARE OP, ALT 636 FOR OP/ED)

## 2025-01-21 PROCEDURE — 2500000005 HC RX 250 GENERAL PHARMACY W/O HCPCS: Performed by: EMERGENCY MEDICINE

## 2025-01-21 PROCEDURE — 99223 1ST HOSP IP/OBS HIGH 75: CPT

## 2025-01-21 PROCEDURE — 83036 HEMOGLOBIN GLYCOSYLATED A1C: CPT | Mod: GENLAB

## 2025-01-21 PROCEDURE — 84075 ASSAY ALKALINE PHOSPHATASE: CPT | Performed by: EMERGENCY MEDICINE

## 2025-01-21 PROCEDURE — 83735 ASSAY OF MAGNESIUM: CPT | Performed by: EMERGENCY MEDICINE

## 2025-01-21 PROCEDURE — 99285 EMERGENCY DEPT VISIT HI MDM: CPT | Performed by: EMERGENCY MEDICINE

## 2025-01-21 PROCEDURE — 71045 X-RAY EXAM CHEST 1 VIEW: CPT | Performed by: RADIOLOGY

## 2025-01-21 PROCEDURE — 2500000004 HC RX 250 GENERAL PHARMACY W/ HCPCS (ALT 636 FOR OP/ED): Mod: JZ

## 2025-01-21 PROCEDURE — 2500000001 HC RX 250 WO HCPCS SELF ADMINISTERED DRUGS (ALT 637 FOR MEDICARE OP)

## 2025-01-21 PROCEDURE — 2500000004 HC RX 250 GENERAL PHARMACY W/ HCPCS (ALT 636 FOR OP/ED): Performed by: EMERGENCY MEDICINE

## 2025-01-21 PROCEDURE — 71045 X-RAY EXAM CHEST 1 VIEW: CPT

## 2025-01-21 PROCEDURE — 85379 FIBRIN DEGRADATION QUANT: CPT | Performed by: EMERGENCY MEDICINE

## 2025-01-21 PROCEDURE — 84145 PROCALCITONIN (PCT): CPT | Mod: GENLAB

## 2025-01-21 PROCEDURE — 96375 TX/PRO/DX INJ NEW DRUG ADDON: CPT

## 2025-01-21 PROCEDURE — 85025 COMPLETE CBC W/AUTO DIFF WBC: CPT | Performed by: EMERGENCY MEDICINE

## 2025-01-21 PROCEDURE — 94760 N-INVAS EAR/PLS OXIMETRY 1: CPT

## 2025-01-21 PROCEDURE — 36415 COLL VENOUS BLD VENIPUNCTURE: CPT

## 2025-01-21 PROCEDURE — 83880 ASSAY OF NATRIURETIC PEPTIDE: CPT | Performed by: EMERGENCY MEDICINE

## 2025-01-21 PROCEDURE — 87637 SARSCOV2&INF A&B&RSV AMP PRB: CPT | Performed by: EMERGENCY MEDICINE

## 2025-01-21 PROCEDURE — 84484 ASSAY OF TROPONIN QUANT: CPT | Performed by: EMERGENCY MEDICINE

## 2025-01-21 PROCEDURE — 96365 THER/PROPH/DIAG IV INF INIT: CPT | Mod: 59

## 2025-01-21 PROCEDURE — 94664 DEMO&/EVAL PT USE INHALER: CPT

## 2025-01-21 PROCEDURE — 93005 ELECTROCARDIOGRAM TRACING: CPT

## 2025-01-21 PROCEDURE — G0378 HOSPITAL OBSERVATION PER HR: HCPCS

## 2025-01-21 PROCEDURE — 96372 THER/PROPH/DIAG INJ SC/IM: CPT

## 2025-01-21 PROCEDURE — 9420000001 HC RT PATIENT EDUCATION 5 MIN

## 2025-01-21 PROCEDURE — 94640 AIRWAY INHALATION TREATMENT: CPT

## 2025-01-21 PROCEDURE — 87077 CULTURE AEROBIC IDENTIFY: CPT | Mod: GENLAB | Performed by: EMERGENCY MEDICINE

## 2025-01-21 PROCEDURE — 36415 COLL VENOUS BLD VENIPUNCTURE: CPT | Performed by: EMERGENCY MEDICINE

## 2025-01-21 RX ORDER — ACETAMINOPHEN 325 MG/1
650 TABLET ORAL EVERY 4 HOURS PRN
Status: DISCONTINUED | OUTPATIENT
Start: 2025-01-21 | End: 2025-01-25 | Stop reason: HOSPADM

## 2025-01-21 RX ORDER — LANOLIN ALCOHOL/MO/W.PET/CERES
1000 CREAM (GRAM) TOPICAL DAILY
COMMUNITY

## 2025-01-21 RX ORDER — IPRATROPIUM BROMIDE AND ALBUTEROL SULFATE 2.5; .5 MG/3ML; MG/3ML
3 SOLUTION RESPIRATORY (INHALATION) 3 TIMES DAILY
Status: DISCONTINUED | OUTPATIENT
Start: 2025-01-21 | End: 2025-01-25 | Stop reason: HOSPADM

## 2025-01-21 RX ORDER — TAMSULOSIN HYDROCHLORIDE 0.4 MG/1
0.4 CAPSULE ORAL DAILY
Status: DISCONTINUED | OUTPATIENT
Start: 2025-01-21 | End: 2025-01-25 | Stop reason: HOSPADM

## 2025-01-21 RX ORDER — ACETAMINOPHEN 160 MG/5ML
650 SOLUTION ORAL EVERY 4 HOURS PRN
Status: DISCONTINUED | OUTPATIENT
Start: 2025-01-21 | End: 2025-01-25 | Stop reason: HOSPADM

## 2025-01-21 RX ORDER — CHOLECALCIFEROL (VITAMIN D3) 25 MCG
1000 TABLET ORAL DAILY
COMMUNITY

## 2025-01-21 RX ORDER — ALLOPURINOL 100 MG/1
100 TABLET ORAL DAILY
Status: DISCONTINUED | OUTPATIENT
Start: 2025-01-21 | End: 2025-01-25 | Stop reason: HOSPADM

## 2025-01-21 RX ORDER — POLYETHYLENE GLYCOL 3350 17 G/17G
17 POWDER, FOR SOLUTION ORAL DAILY PRN
Status: DISCONTINUED | OUTPATIENT
Start: 2025-01-21 | End: 2025-01-25 | Stop reason: HOSPADM

## 2025-01-21 RX ORDER — PANTOPRAZOLE SODIUM 40 MG/10ML
40 INJECTION, POWDER, LYOPHILIZED, FOR SOLUTION INTRAVENOUS
Status: DISCONTINUED | OUTPATIENT
Start: 2025-01-22 | End: 2025-01-25 | Stop reason: HOSPADM

## 2025-01-21 RX ORDER — GUAIFENESIN/DEXTROMETHORPHAN 100-10MG/5
5 SYRUP ORAL EVERY 4 HOURS PRN
Status: DISCONTINUED | OUTPATIENT
Start: 2025-01-21 | End: 2025-01-25 | Stop reason: HOSPADM

## 2025-01-21 RX ORDER — ALBUTEROL SULFATE 0.83 MG/ML
2.5 SOLUTION RESPIRATORY (INHALATION) EVERY 2 HOUR PRN
Status: DISCONTINUED | OUTPATIENT
Start: 2025-01-21 | End: 2025-01-25 | Stop reason: HOSPADM

## 2025-01-21 RX ORDER — CARVEDILOL 25 MG/1
25 TABLET ORAL
Status: DISCONTINUED | OUTPATIENT
Start: 2025-01-21 | End: 2025-01-25 | Stop reason: HOSPADM

## 2025-01-21 RX ORDER — CEFTRIAXONE 2 G/50ML
2 INJECTION, SOLUTION INTRAVENOUS EVERY 24 HOURS
Status: DISCONTINUED | OUTPATIENT
Start: 2025-01-21 | End: 2025-01-24

## 2025-01-21 RX ORDER — AZITHROMYCIN MONOHYDRATE 500 MG/5ML
INJECTION, POWDER, LYOPHILIZED, FOR SOLUTION INTRAVENOUS
Status: COMPLETED
Start: 2025-01-21 | End: 2025-01-21

## 2025-01-21 RX ORDER — ONDANSETRON HYDROCHLORIDE 2 MG/ML
4 INJECTION, SOLUTION INTRAVENOUS EVERY 8 HOURS PRN
Status: DISCONTINUED | OUTPATIENT
Start: 2025-01-21 | End: 2025-01-25 | Stop reason: HOSPADM

## 2025-01-21 RX ORDER — FUROSEMIDE 20 MG/1
20 TABLET ORAL DAILY
Status: DISCONTINUED | OUTPATIENT
Start: 2025-01-21 | End: 2025-01-25 | Stop reason: HOSPADM

## 2025-01-21 RX ORDER — CHOLECALCIFEROL (VITAMIN D3) 25 MCG
2000 TABLET ORAL DAILY
Status: DISCONTINUED | OUTPATIENT
Start: 2025-01-21 | End: 2025-01-25 | Stop reason: HOSPADM

## 2025-01-21 RX ORDER — PANTOPRAZOLE SODIUM 40 MG/1
40 TABLET, DELAYED RELEASE ORAL
Status: DISCONTINUED | OUTPATIENT
Start: 2025-01-22 | End: 2025-01-25 | Stop reason: HOSPADM

## 2025-01-21 RX ORDER — LANOLIN ALCOHOL/MO/W.PET/CERES
1000 CREAM (GRAM) TOPICAL DAILY
Status: DISCONTINUED | OUTPATIENT
Start: 2025-01-21 | End: 2025-01-25 | Stop reason: HOSPADM

## 2025-01-21 RX ORDER — ACETAMINOPHEN 650 MG/1
650 SUPPOSITORY RECTAL EVERY 4 HOURS PRN
Status: DISCONTINUED | OUTPATIENT
Start: 2025-01-21 | End: 2025-01-25 | Stop reason: HOSPADM

## 2025-01-21 RX ORDER — IPRATROPIUM BROMIDE AND ALBUTEROL SULFATE 2.5; .5 MG/3ML; MG/3ML
SOLUTION RESPIRATORY (INHALATION)
Status: COMPLETED
Start: 2025-01-21 | End: 2025-01-21

## 2025-01-21 RX ORDER — TALC
9 POWDER (GRAM) TOPICAL NIGHTLY PRN
Status: DISCONTINUED | OUTPATIENT
Start: 2025-01-21 | End: 2025-01-25 | Stop reason: HOSPADM

## 2025-01-21 RX ORDER — ONDANSETRON 4 MG/1
4 TABLET, FILM COATED ORAL EVERY 8 HOURS PRN
Status: DISCONTINUED | OUTPATIENT
Start: 2025-01-21 | End: 2025-01-25 | Stop reason: HOSPADM

## 2025-01-21 RX ORDER — PRAVASTATIN SODIUM 20 MG/1
20 TABLET ORAL DAILY
Status: DISCONTINUED | OUTPATIENT
Start: 2025-01-21 | End: 2025-01-25 | Stop reason: HOSPADM

## 2025-01-21 RX ORDER — GUAIFENESIN 600 MG/1
600 TABLET, EXTENDED RELEASE ORAL EVERY 12 HOURS PRN
Status: DISCONTINUED | OUTPATIENT
Start: 2025-01-21 | End: 2025-01-25 | Stop reason: HOSPADM

## 2025-01-21 RX ORDER — LEVOTHYROXINE SODIUM 50 UG/1
50 TABLET ORAL DAILY
Status: DISCONTINUED | OUTPATIENT
Start: 2025-01-21 | End: 2025-01-25 | Stop reason: HOSPADM

## 2025-01-21 RX ORDER — LISINOPRIL 10 MG/1
10 TABLET ORAL DAILY
Status: DISCONTINUED | OUTPATIENT
Start: 2025-01-21 | End: 2025-01-25 | Stop reason: HOSPADM

## 2025-01-21 RX ORDER — FINASTERIDE 5 MG/1
5 TABLET, FILM COATED ORAL DAILY
Status: DISCONTINUED | OUTPATIENT
Start: 2025-01-21 | End: 2025-01-25 | Stop reason: HOSPADM

## 2025-01-21 RX ORDER — ENOXAPARIN SODIUM 100 MG/ML
30 INJECTION SUBCUTANEOUS EVERY 24 HOURS
Status: DISCONTINUED | OUTPATIENT
Start: 2025-01-21 | End: 2025-01-25 | Stop reason: HOSPADM

## 2025-01-21 RX ORDER — ASPIRIN 81 MG/1
81 TABLET ORAL DAILY
Status: DISCONTINUED | OUTPATIENT
Start: 2025-01-21 | End: 2025-01-25 | Stop reason: HOSPADM

## 2025-01-21 RX ORDER — IPRATROPIUM BROMIDE AND ALBUTEROL SULFATE 2.5; .5 MG/3ML; MG/3ML
3 SOLUTION RESPIRATORY (INHALATION)
Status: DISCONTINUED | OUTPATIENT
Start: 2025-01-21 | End: 2025-01-21

## 2025-01-21 RX ORDER — IPRATROPIUM BROMIDE AND ALBUTEROL SULFATE 2.5; .5 MG/3ML; MG/3ML
3 SOLUTION RESPIRATORY (INHALATION) ONCE
Status: COMPLETED | OUTPATIENT
Start: 2025-01-21 | End: 2025-01-21

## 2025-01-21 RX ADMIN — LISINOPRIL 10 MG: 10 TABLET ORAL at 14:49

## 2025-01-21 RX ADMIN — AZITHROMYCIN 500 MG: 500 INJECTION, POWDER, LYOPHILIZED, FOR SOLUTION INTRAVENOUS at 11:34

## 2025-01-21 RX ADMIN — CEFTRIAXONE 2 G: 2 INJECTION, SOLUTION INTRAVENOUS at 10:55

## 2025-01-21 RX ADMIN — CARVEDILOL 25 MG: 25 TABLET, FILM COATED ORAL at 16:05

## 2025-01-21 RX ADMIN — Medication 2 L/MIN: at 10:11

## 2025-01-21 RX ADMIN — Medication 2000 UNITS: at 14:49

## 2025-01-21 RX ADMIN — IPRATROPIUM BROMIDE AND ALBUTEROL SULFATE 3 ML: .5; 3 SOLUTION RESPIRATORY (INHALATION) at 10:09

## 2025-01-21 RX ADMIN — IPRATROPIUM BROMIDE AND ALBUTEROL SULFATE 3 ML: .5; 3 SOLUTION RESPIRATORY (INHALATION) at 15:57

## 2025-01-21 RX ADMIN — METHYLPREDNISOLONE SODIUM SUCCINATE 40 MG: 40 INJECTION, POWDER, FOR SOLUTION INTRAMUSCULAR; INTRAVENOUS at 12:38

## 2025-01-21 RX ADMIN — FINASTERIDE 5 MG: 5 TABLET, FILM COATED ORAL at 14:49

## 2025-01-21 RX ADMIN — PRAVASTATIN SODIUM 20 MG: 20 TABLET ORAL at 14:49

## 2025-01-21 RX ADMIN — ENOXAPARIN SODIUM 30 MG: 30 INJECTION SUBCUTANEOUS at 12:39

## 2025-01-21 RX ADMIN — FUROSEMIDE 20 MG: 20 TABLET ORAL at 14:49

## 2025-01-21 RX ADMIN — IPRATROPIUM BROMIDE AND ALBUTEROL SULFATE 3 ML: 2.5; .5 SOLUTION RESPIRATORY (INHALATION) at 10:09

## 2025-01-21 RX ADMIN — LEVOTHYROXINE SODIUM 50 MCG: 0.05 TABLET ORAL at 14:49

## 2025-01-21 RX ADMIN — ALLOPURINOL 100 MG: 100 TABLET ORAL at 14:49

## 2025-01-21 RX ADMIN — ASPIRIN 81 MG: 81 TABLET, COATED ORAL at 14:49

## 2025-01-21 RX ADMIN — IPRATROPIUM BROMIDE AND ALBUTEROL SULFATE 3 ML: .5; 3 SOLUTION RESPIRATORY (INHALATION) at 21:46

## 2025-01-21 RX ADMIN — TAMSULOSIN HYDROCHLORIDE 0.4 MG: 0.4 CAPSULE ORAL at 14:49

## 2025-01-21 RX ADMIN — CYANOCOBALAMIN TAB 1000 MCG 1000 MCG: 1000 TAB at 14:49

## 2025-01-21 RX ADMIN — METHYLPREDNISOLONE SODIUM SUCCINATE 40 MG: 40 INJECTION, POWDER, FOR SOLUTION INTRAMUSCULAR; INTRAVENOUS at 20:12

## 2025-01-21 SDOH — SOCIAL STABILITY: SOCIAL INSECURITY: HAS ANYONE EVER THREATENED TO HURT YOUR FAMILY OR YOUR PETS?: NO

## 2025-01-21 SDOH — ECONOMIC STABILITY: FOOD INSECURITY: WITHIN THE PAST 12 MONTHS, THE FOOD YOU BOUGHT JUST DIDN'T LAST AND YOU DIDN'T HAVE MONEY TO GET MORE.: NEVER TRUE

## 2025-01-21 SDOH — SOCIAL STABILITY: SOCIAL INSECURITY: DO YOU FEEL ANYONE HAS EXPLOITED OR TAKEN ADVANTAGE OF YOU FINANCIALLY OR OF YOUR PERSONAL PROPERTY?: NO

## 2025-01-21 SDOH — SOCIAL STABILITY: SOCIAL INSECURITY: ARE THERE ANY APPARENT SIGNS OF INJURIES/BEHAVIORS THAT COULD BE RELATED TO ABUSE/NEGLECT?: NO

## 2025-01-21 SDOH — SOCIAL STABILITY: SOCIAL INSECURITY: DO YOU FEEL UNSAFE GOING BACK TO THE PLACE WHERE YOU ARE LIVING?: NO

## 2025-01-21 SDOH — SOCIAL STABILITY: SOCIAL INSECURITY: WITHIN THE LAST YEAR, HAVE YOU BEEN HUMILIATED OR EMOTIONALLY ABUSED IN OTHER WAYS BY YOUR PARTNER OR EX-PARTNER?: NO

## 2025-01-21 SDOH — SOCIAL STABILITY: SOCIAL INSECURITY: WITHIN THE LAST YEAR, HAVE YOU BEEN AFRAID OF YOUR PARTNER OR EX-PARTNER?: NO

## 2025-01-21 SDOH — SOCIAL STABILITY: SOCIAL INSECURITY: ARE YOU OR HAVE YOU BEEN THREATENED OR ABUSED PHYSICALLY, EMOTIONALLY, OR SEXUALLY BY ANYONE?: NO

## 2025-01-21 SDOH — SOCIAL STABILITY: SOCIAL INSECURITY
WITHIN THE LAST YEAR, HAVE YOU BEEN RAPED OR FORCED TO HAVE ANY KIND OF SEXUAL ACTIVITY BY YOUR PARTNER OR EX-PARTNER?: NO

## 2025-01-21 SDOH — ECONOMIC STABILITY: FOOD INSECURITY: WITHIN THE PAST 12 MONTHS, YOU WORRIED THAT YOUR FOOD WOULD RUN OUT BEFORE YOU GOT THE MONEY TO BUY MORE.: NEVER TRUE

## 2025-01-21 SDOH — SOCIAL STABILITY: SOCIAL INSECURITY
WITHIN THE LAST YEAR, HAVE YOU BEEN KICKED, HIT, SLAPPED, OR OTHERWISE PHYSICALLY HURT BY YOUR PARTNER OR EX-PARTNER?: NO

## 2025-01-21 SDOH — SOCIAL STABILITY: SOCIAL INSECURITY: HAVE YOU HAD THOUGHTS OF HARMING ANYONE ELSE?: NO

## 2025-01-21 SDOH — ECONOMIC STABILITY: INCOME INSECURITY: IN THE PAST 12 MONTHS HAS THE ELECTRIC, GAS, OIL, OR WATER COMPANY THREATENED TO SHUT OFF SERVICES IN YOUR HOME?: NO

## 2025-01-21 SDOH — SOCIAL STABILITY: SOCIAL INSECURITY: WERE YOU ABLE TO COMPLETE ALL THE BEHAVIORAL HEALTH SCREENINGS?: YES

## 2025-01-21 SDOH — SOCIAL STABILITY: SOCIAL INSECURITY: HAVE YOU HAD ANY THOUGHTS OF HARMING ANYONE ELSE?: NO

## 2025-01-21 SDOH — SOCIAL STABILITY: SOCIAL INSECURITY: DOES ANYONE TRY TO KEEP YOU FROM HAVING/CONTACTING OTHER FRIENDS OR DOING THINGS OUTSIDE YOUR HOME?: NO

## 2025-01-21 SDOH — SOCIAL STABILITY: SOCIAL INSECURITY: ABUSE: ADULT

## 2025-01-21 ASSESSMENT — LIFESTYLE VARIABLES
HOW OFTEN DO YOU HAVE A DRINK CONTAINING ALCOHOL: NEVER
AUDIT-C TOTAL SCORE: 0
HOW OFTEN DO YOU HAVE 6 OR MORE DRINKS ON ONE OCCASION: NEVER
PRESCIPTION_ABUSE_PAST_12_MONTHS: NO
SKIP TO QUESTIONS 9-10: 1
AUDIT-C TOTAL SCORE: 0
SUBSTANCE_ABUSE_PAST_12_MONTHS: NO
HOW MANY STANDARD DRINKS CONTAINING ALCOHOL DO YOU HAVE ON A TYPICAL DAY: PATIENT DOES NOT DRINK

## 2025-01-21 ASSESSMENT — ACTIVITIES OF DAILY LIVING (ADL)
PATIENT'S MEMORY ADEQUATE TO SAFELY COMPLETE DAILY ACTIVITIES?: YES
TOILETING: INDEPENDENT
LACK_OF_TRANSPORTATION: NO
HEARING - LEFT EAR: FUNCTIONAL
GROOMING: INDEPENDENT
LACK_OF_TRANSPORTATION: NO
JUDGMENT_ADEQUATE_SAFELY_COMPLETE_DAILY_ACTIVITIES: YES
FEEDING YOURSELF: INDEPENDENT
HEARING - RIGHT EAR: FUNCTIONAL
ADEQUATE_TO_COMPLETE_ADL: YES
BATHING: INDEPENDENT
DRESSING YOURSELF: INDEPENDENT
WALKS IN HOME: INDEPENDENT

## 2025-01-21 ASSESSMENT — COGNITIVE AND FUNCTIONAL STATUS - GENERAL
MOBILITY SCORE: 20
STANDING UP FROM CHAIR USING ARMS: A LITTLE
MOVING TO AND FROM BED TO CHAIR: A LITTLE
DAILY ACTIVITIY SCORE: 24
STANDING UP FROM CHAIR USING ARMS: A LITTLE
DAILY ACTIVITIY SCORE: 24
PATIENT BASELINE BEDBOUND: NO
CLIMB 3 TO 5 STEPS WITH RAILING: A LITTLE
MOBILITY SCORE: 20
WALKING IN HOSPITAL ROOM: A LITTLE
MOVING TO AND FROM BED TO CHAIR: A LITTLE
CLIMB 3 TO 5 STEPS WITH RAILING: A LITTLE
WALKING IN HOSPITAL ROOM: A LITTLE

## 2025-01-21 ASSESSMENT — ENCOUNTER SYMPTOMS
PSYCHIATRIC NEGATIVE: 1
CARDIOVASCULAR NEGATIVE: 1
GASTROINTESTINAL NEGATIVE: 1
ALLERGIC/IMMUNOLOGIC NEGATIVE: 1
WEAKNESS: 1
EYES NEGATIVE: 1
SHORTNESS OF BREATH: 1
HEMATOLOGIC/LYMPHATIC NEGATIVE: 1
COUGH: 1
MUSCULOSKELETAL NEGATIVE: 1
APPETITE CHANGE: 1
ENDOCRINE NEGATIVE: 1

## 2025-01-21 ASSESSMENT — PAIN - FUNCTIONAL ASSESSMENT
PAIN_FUNCTIONAL_ASSESSMENT: 0-10
PAIN_FUNCTIONAL_ASSESSMENT: 0-10

## 2025-01-21 ASSESSMENT — PATIENT HEALTH QUESTIONNAIRE - PHQ9
2. FEELING DOWN, DEPRESSED OR HOPELESS: NOT AT ALL
1. LITTLE INTEREST OR PLEASURE IN DOING THINGS: NOT AT ALL
SUM OF ALL RESPONSES TO PHQ9 QUESTIONS 1 & 2: 0

## 2025-01-21 ASSESSMENT — PAIN SCALES - GENERAL
PAINLEVEL_OUTOF10: 0 - NO PAIN
PAINLEVEL_OUTOF10: 0 - NO PAIN

## 2025-01-21 ASSESSMENT — COLUMBIA-SUICIDE SEVERITY RATING SCALE - C-SSRS
1. IN THE PAST MONTH, HAVE YOU WISHED YOU WERE DEAD OR WISHED YOU COULD GO TO SLEEP AND NOT WAKE UP?: NO
2. HAVE YOU ACTUALLY HAD ANY THOUGHTS OF KILLING YOURSELF?: NO
6. HAVE YOU EVER DONE ANYTHING, STARTED TO DO ANYTHING, OR PREPARED TO DO ANYTHING TO END YOUR LIFE?: NO

## 2025-01-21 NOTE — DISCHARGE INSTR - OTHER ORDERS
Thank you for choosing [facility] for your Health Care needs.  Also, thank you for allowing us to take you and your families preferences into account when determining your discharge plan.  Stay well!    Your Care Transitions Team Member:Lluvia Castillo and Angie   For questions about your medications listed on your discharge instructions, please call the Nurses Station at [phone].

## 2025-01-21 NOTE — H&P
History Of Present Illness  Cesar Wood is a 84 y.o. male presenting with shortness of breath. Pt states that about 4 days ago, he developed shortness of breath. He reports a cough that is productive of tan sputum. Pt has a history of tobacco use but no diagnosis of COPD and does not wear oxygen at home. Pt denies fever, chills, or night sweats. Pt reports a decrease in appetite, but denies nausea or vomiting. Pt was found to be positive for flu A in the ED and imaging revealed PNA. He was also placed on 2L acutely for hypoxia.     ED VS: T36.9, HR 89, RR 20, /62, Sp02 94%RA    Imaging: CXR- Right-sided airspace consolidations, as above. Clinical correlation  and continued follow-up until clearing is recommended.    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 His smoking use included cigarettes. He has never used smokeless tobacco. He reports that he does not drink alcohol and does not use drugs.    Family History  No family history on file.     Allergies  Patient has no known allergies.    Review of Systems   Constitutional:  Positive for appetite change.   HENT: Negative.     Eyes: Negative.    Respiratory:  Positive for cough and shortness of breath.    Cardiovascular: Negative.    Gastrointestinal: Negative.    Endocrine: Negative.    Genitourinary:  "Negative.    Musculoskeletal: Negative.    Skin: Negative.    Allergic/Immunologic: Negative.    Neurological:  Positive for weakness.   Hematological: Negative.    Psychiatric/Behavioral: Negative.          Physical Exam  Vitals reviewed.   HENT:      Head: Normocephalic and atraumatic.      Right Ear: External ear normal.      Left Ear: External ear normal.      Nose: Nose normal.      Mouth/Throat:      Pharynx: Oropharynx is clear.   Eyes:      Conjunctiva/sclera: Conjunctivae normal.   Cardiovascular:      Rate and Rhythm: Normal rate and regular rhythm.      Pulses: Normal pulses.      Heart sounds: Normal heart sounds.   Pulmonary:      Breath sounds: Wheezing and rhonchi present.   Abdominal:      General: Bowel sounds are normal.      Palpations: Abdomen is soft.   Musculoskeletal:         General: Normal range of motion.      Cervical back: Normal range of motion and neck supple.   Skin:     General: Skin is dry.   Neurological:      General: No focal deficit present.      Mental Status: He is alert and oriented to person, place, and time.      Motor: Weakness present.   Psychiatric:         Mood and Affect: Mood normal.         Behavior: Behavior normal.       Last Recorded Vitals  Blood pressure (!) 138/102, pulse 81, temperature 37.1 °C (98.8 °F), temperature source Temporal, resp. rate 20, height 1.753 m (5' 9\"), weight 95.7 kg (210 lb 15.7 oz), SpO2 96%.    Relevant Results  Scheduled medications  azithromycin, 500 mg, intravenous, q24h  cefTRIAXone, 2 g, intravenous, q24h  enoxaparin, 30 mg, subcutaneous, q24h  ipratropium-albuteroL, 3 mL, nebulization, q6h  methylPREDNISolone sodium succinate (PF), 40 mg, intravenous, q8h  [START ON 1/22/2025] pantoprazole, 40 mg, oral, Daily before breakfast   Or  [START ON 1/22/2025] pantoprazole, 40 mg, intravenous, Daily before breakfast      Continuous medications     PRN medications  PRN medications: acetaminophen **OR** acetaminophen **OR** acetaminophen, " acetaminophen **OR** acetaminophen **OR** acetaminophen, benzocaine-menthol, dextromethorphan-guaifenesin, guaiFENesin, melatonin, ondansetron **OR** ondansetron, oxygen, polyethylene glycol    Results for orders placed or performed during the hospital encounter of 01/21/25 (from the past 24 hours)   CBC and Auto Differential   Result Value Ref Range    WBC 8.2 4.4 - 11.3 x10*3/uL    nRBC 0.0 0.0 - 0.0 /100 WBCs    RBC 4.46 (L) 4.50 - 5.90 x10*6/uL    Hemoglobin 13.5 13.5 - 17.5 g/dL    Hematocrit 40.5 (L) 41.0 - 52.0 %    MCV 91 80 - 100 fL    MCH 30.3 26.0 - 34.0 pg    MCHC 33.3 32.0 - 36.0 g/dL    RDW 13.6 11.5 - 14.5 %    Platelets 173 150 - 450 x10*3/uL    Neutrophils % 76.4 40.0 - 80.0 %    Immature Granulocytes %, Automated 0.6 0.0 - 0.9 %    Lymphocytes % 14.1 13.0 - 44.0 %    Monocytes % 8.7 2.0 - 10.0 %    Eosinophils % 0.0 0.0 - 6.0 %    Basophils % 0.2 0.0 - 2.0 %    Neutrophils Absolute 6.29 (H) 1.60 - 5.50 x10*3/uL    Immature Granulocytes Absolute, Automated 0.05 0.00 - 0.50 x10*3/uL    Lymphocytes Absolute 1.16 0.80 - 3.00 x10*3/uL    Monocytes Absolute 0.72 0.05 - 0.80 x10*3/uL    Eosinophils Absolute 0.00 0.00 - 0.40 x10*3/uL    Basophils Absolute 0.02 0.00 - 0.10 x10*3/uL   Comprehensive Metabolic Panel   Result Value Ref Range    Glucose 241 (H) 74 - 99 mg/dL    Sodium 136 136 - 145 mmol/L    Potassium 5.1 3.5 - 5.3 mmol/L    Chloride 103 98 - 107 mmol/L    Bicarbonate 22 21 - 32 mmol/L    Anion Gap 16 10 - 20 mmol/L    Urea Nitrogen 56 (H) 6 - 23 mg/dL    Creatinine 2.40 (H) 0.50 - 1.30 mg/dL    eGFR 26 (L) >60 mL/min/1.73m*2    Calcium 8.7 8.6 - 10.3 mg/dL    Albumin 3.6 3.4 - 5.0 g/dL    Alkaline Phosphatase 44 33 - 136 U/L    Total Protein 7.3 6.4 - 8.2 g/dL    AST 18 9 - 39 U/L    Bilirubin, Total 0.6 0.0 - 1.2 mg/dL    ALT 14 10 - 52 U/L   Magnesium   Result Value Ref Range    Magnesium 1.91 1.60 - 2.40 mg/dL   Troponin I, High Sensitivity   Result Value Ref Range    Troponin I, High  Sensitivity 12 0 - 20 ng/L   B-type natriuretic peptide   Result Value Ref Range    BNP 90 0 - 99 pg/mL   D-dimer, quantitative   Result Value Ref Range    D-Dimer Non VTE, Quant (ng/mL FEU) 656 (H) <=500 ng/mL FEU   Sars-CoV-2 and Influenza A/B PCR   Result Value Ref Range    Flu A Result Detected (A) Not Detected    Flu B Result Not Detected Not Detected    Coronavirus 2019, PCR Not Detected Not Detected   RSV PCR   Result Value Ref Range    RSV PCR Not Detected Not Detected   ECG 12 lead   Result Value Ref Range    Ventricular Rate 89 BPM    Atrial Rate 89 BPM    CA Interval 170 ms    QRS Duration 74 ms    QT Interval 336 ms    QTC Calculation(Bazett) 408 ms    P Axis 42 degrees    R Axis 78 degrees    T Axis 59 degrees    QRS Count 15 beats    Q Onset 217 ms    P Onset 132 ms    P Offset 190 ms    T Offset 385 ms    QTC Fredericia 383 ms        Assessment/Plan   Assessment & Plan  Influenza A    Pneumonia due to infectious organism    Acute hypoxic respiratory failure (Multi)    Acute renal failure superimposed on stage 3 chronic kidney disease (Multi)    Hypertension, uncontrolled    Hypercholesterolemia    Enlarged prostate without lower urinary tract symptoms (luts)    Gout      #Influenza A  #Pneumonia   #Acute hypoxic respiratory failure   -SOB x4 days   -CXR: Right-sided airspace consolidations, as above. Clinical correlation  and continued follow-up until clearing is recommended.  -WBC 8.2  -D dimer 656   -Influenza A + 1/21  -Covid/RSV neg   -Blood cx pending  -Sputum cx if able   -Procal pending   -IV azithro, ceftriaxone (Day 1)  -Solumedrol q8  -Bronchodilators  -Supplemental oxygen to maintain an sp02 greater than 92%  -Currently on 2L  -Baseline RA  -Isolation protocol    #Acute on chronic renal failure, stage III  -Baseline creat ~ 1.8 - 2.0 according to nephro   -Bun/creat 56/2.40   -Renally dose medications as able   -Hold nephrotoxic agents  -Daily BMP     #Hyperglycemia  -Glucose on admission  241  -No hx of DM  -No A1C on file  -Hgb A1C pending     #Essential HTN  #HLD   #Chronic leg swelling   -Continue ASA, carvedilol, furosemide, lisinopril, pravastatin   -Monitor BP and HR     #BPH  -Continue finasteride, tamsulosin  -Bladder scan PRN    #Gout, not in acute flare   -Continue allopurinol     DVT ppx  -lovenox     PUD ppx  -pantoprazole    F: PRN  E: Replete per protocol  N: Regular  A: PIV    Disposition: Pt requires more than 2 inpatient days at this time   Code Status: Full Code        Elise Mchugh, APRN-CNP    Attending Attestation:    I was present with the APRN-CNP who participated in the documentation of this note. I have personally seen and re-examined the patient and performed the medical decision-making components (assessment and plan of care). I have reviewed the documentation and verified the findings in the note as written with additions or exceptions as stated in the body of this note.    84 year old male presented to the ED for 4 days of shortness of breath. He also developed productive cough at the same time. No formal diagnosis of COPD. In the ED patient was found to be positive for flu A and also imaging showed pneumonia. Patient placed on 2 LPM of oxygen for hypoxia.   Physical exam is suggestive of wheezing and rhonchi.  Blood and sputum culture is pending.  Patient is on IV Azithromycin and ceftriaxone.  He is on Solumedrol, bronchodilators.     Patient also has ARTHUR on CKD stage III- Avoid nephrotoxic agents. Daily BMP.    Curt Rivas MD  Internal Medicine.

## 2025-01-21 NOTE — CONSULTS
"Nutrition Initial Assessment:   Nutrition Assessment    Reason for Assessment: Admission nursing screening    Patient is a 84 y.o. male presenting with shortness of breath. Denies all N/V/D/ abdominal pain at this time. Consulted by [facility] for weight loss and poor appetite. Pt with weight loss of 9 lbs in 6 months. (4.11%) Not clinically significant. Rec; ensure plus HP 1x daily providing an additional 350 kcals and 20g of protein.     Nutrition History:  Food and Nutrient History: Pt reports fair appetite, was admitted this am so no PO intakes recorded at this time. States he has not lost any weight.  Food Allergies/Intolerances:  None  GI Symptoms: None  Oral Problems: None     Anthropometrics:  Height: 175.3 cm (5' 9\")   Weight: 95.7 kg (210 lb 15.7 oz)   BMI (Calculated): 31.14    Weight Change %:  Weight History / % Weight Change: -9 lbs in 6 months, not significant.    Nutrition Focused Physical Exam Findings:  Subcutaneous Fat Loss:   Orbital Fat Pads: Mild-Moderate (slight dark circles and slight hollowing)  Buccal Fat Pads: Mild-Moderate (flat cheeks, minimal bounce)  Muscle Wasting:  Temporalis: Mild-Moderate (slight depression)  Pectoralis (Clavicular Region): Mild-Moderate (some protrusion of clavicle)  Edema:  Edema: none  Physical Findings:  Hair: Negative  Eyes: Negative  Mouth: Negative    Nutrition Significant Labs:  CBC Trend:   Results from last 7 days   Lab Units 01/21/25  0950   WBC AUTO x10*3/uL 8.2   RBC AUTO x10*6/uL 4.46*   HEMOGLOBIN g/dL 13.5   HEMATOCRIT % 40.5*   MCV fL 91   PLATELETS AUTO x10*3/uL 173    , BMP Trend:   Results from last 7 days   Lab Units 01/21/25  0950   GLUCOSE mg/dL 241*   CALCIUM mg/dL 8.7   SODIUM mmol/L 136   POTASSIUM mmol/L 5.1   CO2 mmol/L 22   CHLORIDE mmol/L 103   BUN mg/dL 56*   CREATININE mg/dL 2.40*    , A1C:No results found for: \"HGBA1C\"    Nutrition Specific Medications:  azithromycin, 500 mg, intravenous, q24h  cefTRIAXone, 2 g, intravenous, " q24h  enoxaparin, 30 mg, subcutaneous, q24h  ipratropium-albuteroL, 3 mL, nebulization, q6h  methylPREDNISolone sodium succinate (PF), 40 mg, intravenous, q8h  [START ON 1/22/2025] pantoprazole, 40 mg, oral, Daily before breakfast   Or  [START ON 1/22/2025] pantoprazole, 40 mg, intravenous, Daily before breakfast       Dietary Orders (From admission, onward)       Start     Ordered    01/21/25 1248  Oral nutritional supplements  Until discontinued        Question Answer Comment   Deliver with Breakfast    Select supplement: Ensure Plus High Protein        01/21/25 1247    01/21/25 1213  May Participate in Room Service With Assistance  ( ROOM SERVICE MAY PARTICIPATE WITH ASSISTANCE)  Once        Question:  .  Answer:  Yes    01/21/25 1212    01/21/25 1148  Adult diet Regular  Diet effective now        Question:  Diet type  Answer:  Regular    01/21/25 1147                     Estimated Needs:   Total Energy Estimated Needs (kCal): 2375 kCal  Method for Estimating Needs: 25kcals/kg BW  Total Protein Estimated Needs (g): 95 g  Method for Estimating Needs: 1g/kg BW  Total Fluid Estimated Needs (mL): 2375 mL  Method for Estimating Needs: 1ml/kcal or per MD        Nutrition Diagnosis        Nutrition Diagnosis  Patient has Nutrition Diagnosis: Yes  Diagnosis Status (1): New  Nutrition Diagnosis 1: No nutrition diagnosis at this time       Nutrition Interventions/Recommendations         Nutrition Prescription:  Individualized Nutrition Prescription Provided for : Individualized nutrition prescription of 2375 kcals and 95g of protein to be provided with diet order. Continue with regular diet order.        Nutrition Interventions:   Interventions: Meals and snacks, Medical food supplement  Meals and Snacks: General healthful diet  Goal: consume 3 meals daily.  Medical Food Supplement: Commercial beverage  Goal: ensure plus 1x daily    Nutrition Monitoring and Evaluation   Food/Nutrient Related History  Monitoring  Monitoring and Evaluation Plan: Energy intake  Energy Intake: Estimated energy intake  Criteria: Continue to monitor PO intakes, goal of >50% of all meals.  Additional Plans: ensure plus HP 1x daily (350 kcals and 22g protein)    Body Composition/Growth/Weight History  Monitoring and Evaluation Plan: Weight  Weight: Weight change  Criteria: Continue to monitor wt status and wt changes.    Time Spent (min): 60 minutes

## 2025-01-21 NOTE — PROGRESS NOTES
01/21/25 1308   Discharge Planning   Living Arrangements Alone   Support Systems Children   Assistance Needed Patient is noted to be hard of hearing, A&Ox3. He states he is independent with ADL's, uses a cane occassionally. Drives and shops for himself. Denies needing any assistance upon discharge. States one of his three sons will pick him up when medically ready. Plan is to discharge home with no needs.   Type of Residence Private residence   Number of Stairs to Enter Residence 2   Number of Stairs Within Residence 9   Do you have animals or pets at home? No   Who is requesting discharge planning? Provider   Home or Post Acute Services None   Expected Discharge Disposition Home   Does the patient need discharge transport arranged? No   Financial Resource Strain   How hard is it for you to pay for the very basics like food, housing, medical care, and heating? Not hard   Housing Stability   In the last 12 months, was there a time when you were not able to pay the mortgage or rent on time? N   In the past 12 months, how many times have you moved where you were living? 0   At any time in the past 12 months, were you homeless or living in a shelter (including now)? N   Transportation Needs   In the past 12 months, has lack of transportation kept you from medical appointments or from getting medications? no   In the past 12 months, has lack of transportation kept you from meetings, work, or from getting things needed for daily living? No   Stroke Family Assessment   Stroke Family Assessment Needed No   Intensity of Service   Intensity of Service 0-30 min

## 2025-01-21 NOTE — ED PROVIDER NOTES
HPI   Chief Complaint   Patient presents with    Shortness of Breath       HPI  Patient is an 84-year-old male presenting to the ED today for 4 to 5 days of shortness of breath and cough.  Patient states that approximately 4 days ago, he started developing a productive cough with associated shortness of breath.  Over the last several days, his symptoms have been getting progressively worse.  He notes that he becomes extremely short of breath now with any sort of exertion, so son called EMS to bring patient to the ED for further evaluation.  Patient otherwise denies any history of CHF or COPD.  He normally does not require any supplemental oxygen.  He does not have breathing treatments that he uses at home.  He denies any associated chest pain.  His son at the bedside states that he did have a fever with the onset of his symptoms 4 days ago, but has not had a fever since then.  Patient denies any abdominal pain, nausea, vomiting, or changes in bowel bladder habits.      Patient History   Past Medical History:   Diagnosis Date    Nontoxic diffuse goiter 2013    Simple goiter    Other nonspecific abnormal finding of lung field     Lung mass    Personal history of other diseases of the circulatory system     History of hypertension    Personal history of other endocrine, nutritional and metabolic disease     History of hypothyroidism     Past Surgical History:   Procedure Laterality Date    LUNG LOBECTOMY  2013    Lung Lobectomy     No family history on file.  Social History     Tobacco Use    Smoking status: Former     Current packs/day: 0.00     Types: Cigarettes     Quit date: 10/1/2000     Years since quittin.3    Smokeless tobacco: Never   Vaping Use    Vaping status: Never Used   Substance Use Topics    Alcohol use: Never    Drug use: Never       Physical Exam   ED Triage Vitals [25 0939]   Temperature Heart Rate Respirations BP   36.9 °C (98.4 °F) 89 20 111/62      Pulse Ox Temp Source  Heart Rate Source Patient Position   94 % Temporal Monitor Sitting      BP Location FiO2 (%)     Right arm --       Physical Exam  Vitals and nursing note reviewed.   Constitutional:       General: He is not in acute distress.  HENT:      Head: Normocephalic.      Mouth/Throat:      Mouth: Mucous membranes are moist.   Eyes:      Extraocular Movements: Extraocular movements intact.      Conjunctiva/sclera: Conjunctivae normal.   Cardiovascular:      Rate and Rhythm: Normal rate and regular rhythm.   Pulmonary:      Comments: Tachypneic, with mild accessory muscle use.  Inspiratory and expiratory wheezing throughout all lung fields.  Abdominal:      General: There is no distension.      Palpations: Abdomen is soft.      Tenderness: There is no abdominal tenderness.   Musculoskeletal:         General: No swelling.      Cervical back: Neck supple.   Skin:     General: Skin is warm and dry.      Capillary Refill: Capillary refill takes less than 2 seconds.   Neurological:      General: No focal deficit present.      Mental Status: He is alert. Mental status is at baseline.           ED Course & MDM   ED Course as of 01/21/25 1114   Tue Jan 21, 2025   1026 Chest XR is consistent with R-sided pneumonia. IV antibiotics are ordered for coverage of CAP. [VT]   1036 Patient is found to be flu A positive [VT]   1111 EKG obtained at 938, interpreted by myself.  Normal sinus rhythm with a ventricular rate of 89, no axis deviation, normal intervals, with no acute ischemic changes [VT]      ED Course User Index  [VT] Anabell MADDEN MD         Diagnoses as of 01/21/25 1114   Pneumonia of right lung due to infectious organism, unspecified part of lung   Influenza A   Shortness of breath             No data recorded                               Medical Decision Making  Patient was seen and evaluated for shortness of breath.  Differential diagnosis includes but is not limited to pneumonia, pneumothorax, COPD exacerbation, CHF  exacerbation, PE, ACS, URI, Viral illness, Anemia, Electrolyte abnormality.  On arrival, patient is oxygenating well on room air.  However, he is exhibiting wheezing throughout all lung fields, with increased work of breathing.  Patient is therefore administered a DuoNeb breathing treatment.  He is placed on a cardiac monitor with continuous pulse ox.  Additional labs and imaging are ordered for further evaluation of the patient's symptoms.    Patient's workup is consistent with right-sided pneumonia as well as positive flu A.  Creatinine is elevated at 2.4, though this appears to be consistent with the patient's baseline.    Patient is started on IV Rocephin and azithromycin for coverage of community-acquired pneumonia.    XR chest 1 view   Final Result   Right-sided airspace consolidations, as above. Clinical correlation   and continued follow-up until clearing is recommended.        MACRO:   None.        Signed by: Marco Yeager 1/21/2025 10:17 AM   Dictation workstation:   [id number]        Patient was informed of their lab and imaging results, and all questions and concerns were answered.  Given his increased work of breathing, admission planning for further management was discussed at this time, to which the patient was agreeable.  I discussed the case with Brynn Gillette, DMITRI on-call for the hospitalist service, who accepts patient for admission under Dr. Rivas.      Procedure  Procedures     Anabell MADDEN MD  01/21/25 1114

## 2025-01-22 PROBLEM — J18.9 PNEUMONIA OF RIGHT LUNG DUE TO INFECTIOUS ORGANISM, UNSPECIFIED PART OF LUNG: Status: ACTIVE | Noted: 2025-01-22

## 2025-01-22 LAB
ANION GAP SERPL CALC-SCNC: 16 MMOL/L (ref 10–20)
BUN SERPL-MCNC: 64 MG/DL (ref 6–23)
CALCIUM SERPL-MCNC: 8.5 MG/DL (ref 8.6–10.3)
CHLORIDE SERPL-SCNC: 103 MMOL/L (ref 98–107)
CO2 SERPL-SCNC: 22 MMOL/L (ref 21–32)
CREAT SERPL-MCNC: 2.61 MG/DL (ref 0.5–1.3)
EGFRCR SERPLBLD CKD-EPI 2021: 23 ML/MIN/1.73M*2
ERYTHROCYTE [DISTWIDTH] IN BLOOD BY AUTOMATED COUNT: 13.7 % (ref 11.5–14.5)
GLUCOSE SERPL-MCNC: 192 MG/DL (ref 74–99)
HCT VFR BLD AUTO: 37.8 % (ref 41–52)
HGB BLD-MCNC: 12.4 G/DL (ref 13.5–17.5)
MCH RBC QN AUTO: 30.2 PG (ref 26–34)
MCHC RBC AUTO-ENTMCNC: 32.8 G/DL (ref 32–36)
MCV RBC AUTO: 92 FL (ref 80–100)
NRBC BLD-RTO: 0 /100 WBCS (ref 0–0)
PLATELET # BLD AUTO: 138 X10*3/UL (ref 150–450)
POTASSIUM SERPL-SCNC: 5.4 MMOL/L (ref 3.5–5.3)
PROCALCITONIN SERPL-MCNC: 0.23 NG/ML
RBC # BLD AUTO: 4.11 X10*6/UL (ref 4.5–5.9)
SODIUM SERPL-SCNC: 136 MMOL/L (ref 136–145)
WBC # BLD AUTO: 6.3 X10*3/UL (ref 4.4–11.3)

## 2025-01-22 PROCEDURE — 94760 N-INVAS EAR/PLS OXIMETRY 1: CPT | Mod: IPSPLIT

## 2025-01-22 PROCEDURE — 2500000004 HC RX 250 GENERAL PHARMACY W/ HCPCS (ALT 636 FOR OP/ED)

## 2025-01-22 PROCEDURE — 80048 BASIC METABOLIC PNL TOTAL CA: CPT

## 2025-01-22 PROCEDURE — 2500000002 HC RX 250 W HCPCS SELF ADMINISTERED DRUGS (ALT 637 FOR MEDICARE OP, ALT 636 FOR OP/ED)

## 2025-01-22 PROCEDURE — 94640 AIRWAY INHALATION TREATMENT: CPT | Mod: IPSPLIT

## 2025-01-22 PROCEDURE — 36415 COLL VENOUS BLD VENIPUNCTURE: CPT

## 2025-01-22 PROCEDURE — 99232 SBSQ HOSP IP/OBS MODERATE 35: CPT

## 2025-01-22 PROCEDURE — 2500000001 HC RX 250 WO HCPCS SELF ADMINISTERED DRUGS (ALT 637 FOR MEDICARE OP)

## 2025-01-22 PROCEDURE — 96372 THER/PROPH/DIAG INJ SC/IM: CPT

## 2025-01-22 PROCEDURE — 85027 COMPLETE CBC AUTOMATED: CPT

## 2025-01-22 PROCEDURE — 2500000004 HC RX 250 GENERAL PHARMACY W/ HCPCS (ALT 636 FOR OP/ED): Mod: IPSPLIT | Performed by: STUDENT IN AN ORGANIZED HEALTH CARE EDUCATION/TRAINING PROGRAM

## 2025-01-22 PROCEDURE — 87641 MR-STAPH DNA AMP PROBE: CPT | Mod: IPSPLIT | Performed by: STUDENT IN AN ORGANIZED HEALTH CARE EDUCATION/TRAINING PROGRAM

## 2025-01-22 PROCEDURE — 1200000002 HC GENERAL ROOM WITH TELEMETRY DAILY: Mod: IPSPLIT

## 2025-01-22 RX ORDER — AZITHROMYCIN 250 MG/1
500 TABLET, FILM COATED ORAL
Status: DISCONTINUED | OUTPATIENT
Start: 2025-01-22 | End: 2025-01-25 | Stop reason: HOSPADM

## 2025-01-22 RX ORDER — VANCOMYCIN HYDROCHLORIDE 1 G/200ML
1000 INJECTION, SOLUTION INTRAVENOUS
Status: DISPENSED | OUTPATIENT
Start: 2025-01-22 | End: 2025-01-23

## 2025-01-22 RX ORDER — VANCOMYCIN HYDROCHLORIDE 1 G/20ML
INJECTION, POWDER, LYOPHILIZED, FOR SOLUTION INTRAVENOUS DAILY PRN
Status: DISCONTINUED | OUTPATIENT
Start: 2025-01-22 | End: 2025-01-23

## 2025-01-22 RX ADMIN — SODIUM ZIRCONIUM CYCLOSILICATE 10 G: 10 POWDER, FOR SUSPENSION ORAL at 15:35

## 2025-01-22 RX ADMIN — LEVOTHYROXINE SODIUM 50 MCG: 0.05 TABLET ORAL at 09:54

## 2025-01-22 RX ADMIN — METHYLPREDNISOLONE SODIUM SUCCINATE 40 MG: 40 INJECTION, POWDER, FOR SOLUTION INTRAMUSCULAR; INTRAVENOUS at 05:31

## 2025-01-22 RX ADMIN — AZITHROMYCIN DIHYDRATE 500 MG: 250 TABLET, FILM COATED ORAL at 09:54

## 2025-01-22 RX ADMIN — PANTOPRAZOLE SODIUM 40 MG: 40 TABLET, DELAYED RELEASE ORAL at 05:31

## 2025-01-22 RX ADMIN — VANCOMYCIN HYDROCHLORIDE 1000 MG: 1 INJECTION, SOLUTION INTRAVENOUS at 23:20

## 2025-01-22 RX ADMIN — CARVEDILOL 25 MG: 25 TABLET, FILM COATED ORAL at 09:54

## 2025-01-22 RX ADMIN — ASPIRIN 81 MG: 81 TABLET, COATED ORAL at 09:54

## 2025-01-22 RX ADMIN — METHYLPREDNISOLONE SODIUM SUCCINATE 40 MG: 40 INJECTION, POWDER, FOR SOLUTION INTRAMUSCULAR; INTRAVENOUS at 20:48

## 2025-01-22 RX ADMIN — CYANOCOBALAMIN TAB 1000 MCG 1000 MCG: 1000 TAB at 09:54

## 2025-01-22 RX ADMIN — ENOXAPARIN SODIUM 30 MG: 30 INJECTION SUBCUTANEOUS at 13:09

## 2025-01-22 RX ADMIN — IPRATROPIUM BROMIDE AND ALBUTEROL SULFATE 3 ML: .5; 3 SOLUTION RESPIRATORY (INHALATION) at 20:07

## 2025-01-22 RX ADMIN — TAMSULOSIN HYDROCHLORIDE 0.4 MG: 0.4 CAPSULE ORAL at 09:54

## 2025-01-22 RX ADMIN — SODIUM ZIRCONIUM CYCLOSILICATE 10 G: 10 POWDER, FOR SUSPENSION ORAL at 09:54

## 2025-01-22 RX ADMIN — FINASTERIDE 5 MG: 5 TABLET, FILM COATED ORAL at 09:54

## 2025-01-22 RX ADMIN — IPRATROPIUM BROMIDE AND ALBUTEROL SULFATE 3 ML: .5; 3 SOLUTION RESPIRATORY (INHALATION) at 15:18

## 2025-01-22 RX ADMIN — IPRATROPIUM BROMIDE AND ALBUTEROL SULFATE 3 ML: .5; 3 SOLUTION RESPIRATORY (INHALATION) at 09:23

## 2025-01-22 RX ADMIN — METHYLPREDNISOLONE SODIUM SUCCINATE 40 MG: 40 INJECTION, POWDER, FOR SOLUTION INTRAMUSCULAR; INTRAVENOUS at 13:08

## 2025-01-22 RX ADMIN — Medication 2000 UNITS: at 09:54

## 2025-01-22 RX ADMIN — SODIUM ZIRCONIUM CYCLOSILICATE 10 G: 10 POWDER, FOR SUSPENSION ORAL at 20:48

## 2025-01-22 RX ADMIN — CEFTRIAXONE 2 G: 2 INJECTION, SOLUTION INTRAVENOUS at 09:53

## 2025-01-22 RX ADMIN — CARVEDILOL 25 MG: 25 TABLET, FILM COATED ORAL at 16:56

## 2025-01-22 ASSESSMENT — PAIN - FUNCTIONAL ASSESSMENT
PAIN_FUNCTIONAL_ASSESSMENT: 0-10
PAIN_FUNCTIONAL_ASSESSMENT: 0-10

## 2025-01-22 ASSESSMENT — COGNITIVE AND FUNCTIONAL STATUS - GENERAL
STANDING UP FROM CHAIR USING ARMS: A LITTLE
CLIMB 3 TO 5 STEPS WITH RAILING: A LITTLE
MOVING TO AND FROM BED TO CHAIR: A LITTLE
WALKING IN HOSPITAL ROOM: A LITTLE
MOBILITY SCORE: 20
DAILY ACTIVITIY SCORE: 24

## 2025-01-22 ASSESSMENT — PAIN SCALES - GENERAL
PAINLEVEL_OUTOF10: 0 - NO PAIN
PAINLEVEL_OUTOF10: 0 - NO PAIN

## 2025-01-22 NOTE — PROGRESS NOTES
01/22/25 1153   Discharge Planning   Assistance Needed Met with patient, aware provider ordered PT/OT to see him. Discharge plan is pending the PT/OT eval.   Who is requesting discharge planning? Provider   Home or Post Acute Services None   Expected Discharge Disposition Home   Does the patient need discharge transport arranged? No

## 2025-01-22 NOTE — CARE PLAN
The patient's goals for the shift include sleep    The clinical goals for the shift include pt will utilize call light for assistance during this shift.    Over the shift, the patient had uneventful night. Pt remains on droplet precautions. Pt utilized urinal and expresses no further needs at this time. Call light within reach for assistance.

## 2025-01-22 NOTE — PROGRESS NOTES
Pharmacy Medication History Review    Cesar Wood is a 84 y.o. male admitted for Influenza A. Pharmacy reviewed the patient's [id number] medications and allergies for accuracy.    Sources used to confirm medication list: Informant interview  Informant: Patient  Informant credibility: Excellent (Able to recall medication, indication, strength, frequency, and/or prescriber for >90% of medications).    Total number of adjustments: 1     Medications Added Medications Removed Medications Adjusted  Adjustments Made     Vitamin D 2 tabs --> 1 tab     The list below reflectives the updated PTA list. Please review each medication in order reconciliation for additional clarification and justification.  Medications Prior to Admission   Medication Sig Dispense Refill Last Dose/Taking    allopurinol (Zyloprim) 100 mg tablet Take 1 tablet (100 mg) by mouth once daily. 90 tablet 1 1/20/2025    aspirin 81 mg EC tablet Take 1 tablet (81 mg) by mouth once daily.   1/20/2025    carvedilol (Coreg) 25 mg tablet Take 1 tablet (25 mg) by mouth 2 times daily (morning and late afternoon). 180 tablet 1 1/21/2025    cholecalciferol (Vitamin D3) 25 MCG (1000 UT) tablet Take 1 tablet (1,000 Units) by mouth once daily.   1/20/2025    cyanocobalamin (Vitamin B-12) 1,000 mcg tablet Take 1 tablet (1,000 mcg) by mouth once daily.   1/20/2025    finasteride (Proscar) 5 mg tablet Take 1 tablet (5 mg) by mouth once daily. DO NOT CRUSH CHEW OR SPLIT 90 tablet 1 1/20/2025    furosemide (Lasix) 20 mg tablet Take 1 tablet (20 mg) by mouth once daily. 90 tablet 1 1/20/2025    levothyroxine (Synthroid, Levoxyl) 50 mcg tablet TAKE ONE TABLET BY MOUTH ONCE DAILY 90 tablet 0 1/20/2025    lisinopril 10 mg tablet Take 1 tablet (10 mg) by mouth once daily. 30 tablet 11 1/20/2025    pravastatin (Pravachol) 20 mg tablet Take 1 tablet (20 mg) by mouth once daily. 90 tablet 1 1/20/2025    tamsulosin (Flomax) 0.4 mg 24 hr capsule TAKE ONE CAPSULE BY MOUTH  EVERY DAY 30 MINUTES AFTER THE SAME MEAL EACH DAY 90 capsule 1 1/20/2025        The list below reflectives the updated allergy list. Please review each documented allergy for additional clarification and justification.  Allergies  Reviewed by Leobardo Morton RN on 1/21/2025   No Known Allergies         Below are additional concerns with the patient's PTA list.  Spoke w/pt at bedside. Reviewed PTA and allergy list. Please review changes made in the chart above.    Leandra Uribe CPhT  Medication History Pharmacy Technician  [facility] 8-4:30  Available via [employer] Secure Chat  OR  [phone]

## 2025-01-22 NOTE — PROGRESS NOTES
Cesar Wood is a 84 y.o. male on day 0 of admission presenting with Influenza A.      Subjective   Pt assessed at bedside. Son in visiting. Pt denies any acute concerns despite feeling weak. Will add on therapy today. Stable on RA.        Objective     Last Recorded Vitals  /64 (BP Location: Right arm, Patient Position: Lying)   Pulse 62   Temp 36.4 °C (97.5 °F) (Temporal)   Resp 19   Wt 95.7 kg (210 lb 15.7 oz)   SpO2 96%   Intake/Output last 3 Shifts:    Intake/Output Summary (Last 24 hours) at 1/22/2025 0940  Last data filed at 1/22/2025 0900  Gross per 24 hour   Intake 890 ml   Output 775 ml   Net 115 ml       Admission Weight  Weight: 99.8 kg (220 lb) (01/21/25 0939)    Daily Weight  01/21/25 : 95.7 kg (210 lb 15.7 oz)    Image Results  ECG 12 lead  Normal sinus rhythm  Low voltage QRS  Borderline ECG  When compared with ECG of 22-SEP-2015 01:14,  T wave inversion no longer evident in Inferior leads  XR chest 1 view  Narrative: Interpreted By:  Marco Yeager,   STUDY:  XR CHEST 1 VIEW  1/21/2025 9:58 am      INDICATION:  Signs/Symptoms:sob      COMPARISON:  09/23/2015      ACCESSION NUMBER(S):  [id number]      ORDERING CLINICIAN:  ARLENE BLOCK      TECHNIQUE:  A single AP portable radiograph of the chest was obtained.      FINDINGS:  Multiple cardiac monitoring leads are seen over the chest.  Airspace  consolidations are seen in the perihilar aspect of the right breast  and at the right lung base, and may represent small pleural effusion,  atelectasis and/or pneumonia. Underlying mass cannot be excluded. No  pneumothorax is identified. The cardiac silhouette is within normal  limits for size.      Impression: Right-sided airspace consolidations, as above. Clinical correlation  and continued follow-up until clearing is recommended.      MACRO:  None.      Signed by: Marco Yeager 1/21/2025 10:17 AM  Dictation workstation:   [id number]      Physical Exam  Vitals reviewed.   HENT:      Head:  Normocephalic and atraumatic.      Right Ear: External ear normal.      Left Ear: External ear normal.      Nose: Nose normal.      Mouth/Throat:      Pharynx: Oropharynx is clear.   Eyes:      Conjunctiva/sclera: Conjunctivae normal.   Cardiovascular:      Rate and Rhythm: Normal rate and regular rhythm.      Pulses: Normal pulses.      Heart sounds: Normal heart sounds.   Pulmonary:      Breath sounds: Wheezing and rhonchi present.   Abdominal:      General: Bowel sounds are normal.      Palpations: Abdomen is soft.   Musculoskeletal:         General: Normal range of motion.      Cervical back: Normal range of motion and neck supple.   Skin:     General: Skin is dry.   Neurological:      General: No focal deficit present.      Mental Status: He is alert and oriented to person, place, and time.      Motor: Weakness present.   Psychiatric:         Mood and Affect: Mood normal.         Behavior: Behavior normal.       Relevant Results  Scheduled medications  [Held by provider] allopurinol, 100 mg, oral, Daily  aspirin, 81 mg, oral, Daily  azithromycin, 500 mg, oral, q24h EUGENIA  carvedilol, 25 mg, oral, BID  cefTRIAXone, 2 g, intravenous, q24h  cholecalciferol, 2,000 Units, oral, Daily  cyanocobalamin, 1,000 mcg, oral, Daily  enoxaparin, 30 mg, subcutaneous, q24h  finasteride, 5 mg, oral, Daily  [Held by provider] furosemide, 20 mg, oral, Daily  ipratropium-albuteroL, 3 mL, nebulization, TID  levothyroxine, 50 mcg, oral, Daily  [Held by provider] lisinopril, 10 mg, oral, Daily  methylPREDNISolone sodium succinate (PF), 40 mg, intravenous, q8h  pantoprazole, 40 mg, oral, Daily before breakfast   Or  pantoprazole, 40 mg, intravenous, Daily before breakfast  [Held by provider] pravastatin, 20 mg, oral, Daily  sodium zirconium cyclosilicate, 10 g, oral, TID  tamsulosin, 0.4 mg, oral, Daily      Continuous medications     PRN medications  PRN medications: acetaminophen **OR** acetaminophen **OR** acetaminophen, acetaminophen  **OR** acetaminophen **OR** acetaminophen, albuterol, benzocaine-menthol, dextromethorphan-guaifenesin, guaiFENesin, melatonin, ondansetron **OR** ondansetron, oxygen, polyethylene glycol    Results for orders placed or performed during the hospital encounter of 01/21/25 (from the past 24 hours)   CBC and Auto Differential   Result Value Ref Range    WBC 8.2 4.4 - 11.3 x10*3/uL    nRBC 0.0 0.0 - 0.0 /100 WBCs    RBC 4.46 (L) 4.50 - 5.90 x10*6/uL    Hemoglobin 13.5 13.5 - 17.5 g/dL    Hematocrit 40.5 (L) 41.0 - 52.0 %    MCV 91 80 - 100 fL    MCH 30.3 26.0 - 34.0 pg    MCHC 33.3 32.0 - 36.0 g/dL    RDW 13.6 11.5 - 14.5 %    Platelets 173 150 - 450 x10*3/uL    Neutrophils % 76.4 40.0 - 80.0 %    Immature Granulocytes %, Automated 0.6 0.0 - 0.9 %    Lymphocytes % 14.1 13.0 - 44.0 %    Monocytes % 8.7 2.0 - 10.0 %    Eosinophils % 0.0 0.0 - 6.0 %    Basophils % 0.2 0.0 - 2.0 %    Neutrophils Absolute 6.29 (H) 1.60 - 5.50 x10*3/uL    Immature Granulocytes Absolute, Automated 0.05 0.00 - 0.50 x10*3/uL    Lymphocytes Absolute 1.16 0.80 - 3.00 x10*3/uL    Monocytes Absolute 0.72 0.05 - 0.80 x10*3/uL    Eosinophils Absolute 0.00 0.00 - 0.40 x10*3/uL    Basophils Absolute 0.02 0.00 - 0.10 x10*3/uL   Comprehensive Metabolic Panel   Result Value Ref Range    Glucose 241 (H) 74 - 99 mg/dL    Sodium 136 136 - 145 mmol/L    Potassium 5.1 3.5 - 5.3 mmol/L    Chloride 103 98 - 107 mmol/L    Bicarbonate 22 21 - 32 mmol/L    Anion Gap 16 10 - 20 mmol/L    Urea Nitrogen 56 (H) 6 - 23 mg/dL    Creatinine 2.40 (H) 0.50 - 1.30 mg/dL    eGFR 26 (L) >60 mL/min/1.73m*2    Calcium 8.7 8.6 - 10.3 mg/dL    Albumin 3.6 3.4 - 5.0 g/dL    Alkaline Phosphatase 44 33 - 136 U/L    Total Protein 7.3 6.4 - 8.2 g/dL    AST 18 9 - 39 U/L    Bilirubin, Total 0.6 0.0 - 1.2 mg/dL    ALT 14 10 - 52 U/L   Magnesium   Result Value Ref Range    Magnesium 1.91 1.60 - 2.40 mg/dL   Troponin I, High Sensitivity   Result Value Ref Range    Troponin I, High  Sensitivity 12 0 - 20 ng/L   B-type natriuretic peptide   Result Value Ref Range    BNP 90 0 - 99 pg/mL   D-dimer, quantitative   Result Value Ref Range    D-Dimer Non VTE, Quant (ng/mL FEU) 656 (H) <=500 ng/mL FEU   Sars-CoV-2 and Influenza A/B PCR   Result Value Ref Range    Flu A Result Detected (A) Not Detected    Flu B Result Not Detected Not Detected    Coronavirus 2019, PCR Not Detected Not Detected   RSV PCR   Result Value Ref Range    RSV PCR Not Detected Not Detected   ECG 12 lead   Result Value Ref Range    Ventricular Rate 89 BPM    Atrial Rate 89 BPM    IA Interval 170 ms    QRS Duration 74 ms    QT Interval 336 ms    QTC Calculation(Bazett) 408 ms    P Axis 42 degrees    R Axis 78 degrees    T Axis 59 degrees    QRS Count 15 beats    Q Onset 217 ms    P Onset 132 ms    P Offset 190 ms    T Offset 385 ms    QTC Fredericia 383 ms   Blood Culture    Specimen: Peripheral Venipuncture; Blood culture   Result Value Ref Range    Blood Culture Loaded on Instrument - Culture in progress    Blood Culture    Specimen: Peripheral Venipuncture; Blood culture   Result Value Ref Range    Blood Culture Loaded on Instrument - Culture in progress    Procalcitonin   Result Value Ref Range    Procalcitonin 0.23 (H) <=0.07 ng/mL   Hemoglobin A1c   Result Value Ref Range    Hemoglobin A1C 6.1 (H) See comment %    Estimated Average Glucose 128 Not Established mg/dL   CBC   Result Value Ref Range    WBC 6.3 4.4 - 11.3 x10*3/uL    nRBC 0.0 0.0 - 0.0 /100 WBCs    RBC 4.11 (L) 4.50 - 5.90 x10*6/uL    Hemoglobin 12.4 (L) 13.5 - 17.5 g/dL    Hematocrit 37.8 (L) 41.0 - 52.0 %    MCV 92 80 - 100 fL    MCH 30.2 26.0 - 34.0 pg    MCHC 32.8 32.0 - 36.0 g/dL    RDW 13.7 11.5 - 14.5 %    Platelets 138 (L) 150 - 450 x10*3/uL   Basic metabolic panel   Result Value Ref Range    Glucose 192 (H) 74 - 99 mg/dL    Sodium 136 136 - 145 mmol/L    Potassium 5.4 (H) 3.5 - 5.3 mmol/L    Chloride 103 98 - 107 mmol/L    Bicarbonate 22 21 - 32 mmol/L     Anion Gap 16 10 - 20 mmol/L    Urea Nitrogen 64 (H) 6 - 23 mg/dL    Creatinine 2.61 (H) 0.50 - 1.30 mg/dL    eGFR 23 (L) >60 mL/min/1.73m*2    Calcium 8.5 (L) 8.6 - 10.3 mg/dL          Assessment/Plan      Assessment & Plan  Influenza A    Pneumonia due to infectious organism    Acute hypoxic respiratory failure (Multi)    Acute renal failure superimposed on stage 3 chronic kidney disease (Multi)    Hypertension, uncontrolled    Hypercholesterolemia    Enlarged prostate without lower urinary tract symptoms (luts)    Gout    #Influenza A  #Pneumonia   #Acute hypoxic respiratory failure   -SOB x4 days   -CXR: Right-sided airspace consolidations, as above. Clinical correlation  and continued follow-up until clearing is recommended.  -WBC 8.2  -D dimer 656   -Influenza A + 1/21  -Covid/RSV neg   -Blood cx pending  -Sputum cx if able   -Procal 0.23  -IV azithro, ceftriaxone (Day 2)  -Solumedrol q8  -Bronchodilators  -Supplemental oxygen to maintain an sp02 greater than 92%  -Currently on 2L > weaned to RA   -Baseline RA  -Isolation protocol     #Acute on chronic renal failure, stage III  -Baseline creat ~ 1.8 - 2.0 according to nephro   -Bun/creat 56/2.40 > 64/2.61  -Renally dose medications as able   -Hold nephrotoxic agents; allopurinol, furosemide, lisinopril, pravastatin  -Daily BMP     #Hyperkalemia   -K 5.4  -Lokelma 10gm TID x1 day  -Daily BMP      #Hyperglycemia  -Glucose on admission 241  -No hx of DM  -No A1C on file  -Hgb A1C 6.1  -Follow up with PCP      #Essential HTN  #HLD   #Chronic leg swelling   -Continue ASA, carvedilol  -Monitor BP and HR      #BPH  -Continue finasteride, tamsulosin  -Bladder scan PRN     #Gout, not in acute flare   -Continue allopurinol; on hold currently      DVT ppx  -lovenox      PUD ppx  -pantoprazole     F: PRN  E: Replete per protocol  N: Regular  A: PIV     Disposition: Pt requires more than 2 inpatient days at this time   Code Status: Full Code         Elise Mchugh  APRN-CNP

## 2025-01-22 NOTE — CARE PLAN
The patient's goals for the shift include sleep    The clinical goals for the shift include patients pulse ox will maintain  >92%  on room air    Goal meet. Vss. Patient denies pain. IV and oral ATB continues. Patient worked with therapy today. Patients sons was here visiting earlier today.

## 2025-01-23 LAB
ANION GAP SERPL CALC-SCNC: 14 MMOL/L (ref 10–20)
BUN SERPL-MCNC: 71 MG/DL (ref 6–23)
CALCIUM SERPL-MCNC: 8.6 MG/DL (ref 8.6–10.3)
CHLORIDE SERPL-SCNC: 102 MMOL/L (ref 98–107)
CO2 SERPL-SCNC: 24 MMOL/L (ref 21–32)
CREAT SERPL-MCNC: 2.09 MG/DL (ref 0.5–1.3)
EGFRCR SERPLBLD CKD-EPI 2021: 30 ML/MIN/1.73M*2
ERYTHROCYTE [DISTWIDTH] IN BLOOD BY AUTOMATED COUNT: 13.6 % (ref 11.5–14.5)
GLUCOSE SERPL-MCNC: 224 MG/DL (ref 74–99)
HCT VFR BLD AUTO: 38.6 % (ref 41–52)
HGB BLD-MCNC: 13.1 G/DL (ref 13.5–17.5)
HOLD SPECIMEN: NORMAL
MCH RBC QN AUTO: 30.8 PG (ref 26–34)
MCHC RBC AUTO-ENTMCNC: 33.9 G/DL (ref 32–36)
MCV RBC AUTO: 91 FL (ref 80–100)
MRSA DNA SPEC QL NAA+PROBE: NOT DETECTED
NRBC BLD-RTO: 0 /100 WBCS (ref 0–0)
PLATELET # BLD AUTO: 156 X10*3/UL (ref 150–450)
POTASSIUM SERPL-SCNC: 4.4 MMOL/L (ref 3.5–5.3)
RBC # BLD AUTO: 4.26 X10*6/UL (ref 4.5–5.9)
SODIUM SERPL-SCNC: 136 MMOL/L (ref 136–145)
WBC # BLD AUTO: 8.1 X10*3/UL (ref 4.4–11.3)

## 2025-01-23 PROCEDURE — 36415 COLL VENOUS BLD VENIPUNCTURE: CPT | Mod: IPSPLIT

## 2025-01-23 PROCEDURE — 85027 COMPLETE CBC AUTOMATED: CPT | Mod: IPSPLIT

## 2025-01-23 PROCEDURE — 99232 SBSQ HOSP IP/OBS MODERATE 35: CPT

## 2025-01-23 PROCEDURE — 2500000004 HC RX 250 GENERAL PHARMACY W/ HCPCS (ALT 636 FOR OP/ED): Mod: IPSPLIT

## 2025-01-23 PROCEDURE — 87040 BLOOD CULTURE FOR BACTERIA: CPT | Mod: GENLAB

## 2025-01-23 PROCEDURE — 97161 PT EVAL LOW COMPLEX 20 MIN: CPT | Mod: GP,IPSPLIT | Performed by: PHYSICAL THERAPIST

## 2025-01-23 PROCEDURE — 2500000001 HC RX 250 WO HCPCS SELF ADMINISTERED DRUGS (ALT 637 FOR MEDICARE OP): Mod: IPSPLIT

## 2025-01-23 PROCEDURE — 97165 OT EVAL LOW COMPLEX 30 MIN: CPT | Mod: GO,IPSPLIT

## 2025-01-23 PROCEDURE — 94760 N-INVAS EAR/PLS OXIMETRY 1: CPT | Mod: IPSPLIT

## 2025-01-23 PROCEDURE — 1200000002 HC GENERAL ROOM WITH TELEMETRY DAILY: Mod: IPSPLIT

## 2025-01-23 PROCEDURE — 94640 AIRWAY INHALATION TREATMENT: CPT | Mod: IPSPLIT

## 2025-01-23 PROCEDURE — 82374 ASSAY BLOOD CARBON DIOXIDE: CPT | Mod: IPSPLIT

## 2025-01-23 PROCEDURE — 2500000004 HC RX 250 GENERAL PHARMACY W/ HCPCS (ALT 636 FOR OP/ED): Mod: JZ,IPSPLIT

## 2025-01-23 PROCEDURE — 2500000002 HC RX 250 W HCPCS SELF ADMINISTERED DRUGS (ALT 637 FOR MEDICARE OP, ALT 636 FOR OP/ED): Mod: IPSPLIT

## 2025-01-23 RX ADMIN — TAMSULOSIN HYDROCHLORIDE 0.4 MG: 0.4 CAPSULE ORAL at 09:22

## 2025-01-23 RX ADMIN — CARVEDILOL 25 MG: 25 TABLET, FILM COATED ORAL at 17:22

## 2025-01-23 RX ADMIN — LEVOTHYROXINE SODIUM 50 MCG: 0.05 TABLET ORAL at 09:21

## 2025-01-23 RX ADMIN — IPRATROPIUM BROMIDE AND ALBUTEROL SULFATE 3 ML: .5; 3 SOLUTION RESPIRATORY (INHALATION) at 10:19

## 2025-01-23 RX ADMIN — AZITHROMYCIN DIHYDRATE 500 MG: 250 TABLET, FILM COATED ORAL at 09:22

## 2025-01-23 RX ADMIN — IPRATROPIUM BROMIDE AND ALBUTEROL SULFATE 3 ML: .5; 3 SOLUTION RESPIRATORY (INHALATION) at 15:52

## 2025-01-23 RX ADMIN — CEFTRIAXONE 2 G: 2 INJECTION, SOLUTION INTRAVENOUS at 09:21

## 2025-01-23 RX ADMIN — ASPIRIN 81 MG: 81 TABLET, COATED ORAL at 09:22

## 2025-01-23 RX ADMIN — PANTOPRAZOLE SODIUM 40 MG: 40 TABLET, DELAYED RELEASE ORAL at 06:12

## 2025-01-23 RX ADMIN — CARVEDILOL 25 MG: 25 TABLET, FILM COATED ORAL at 09:21

## 2025-01-23 RX ADMIN — METHYLPREDNISOLONE SODIUM SUCCINATE 40 MG: 40 INJECTION, POWDER, FOR SOLUTION INTRAMUSCULAR; INTRAVENOUS at 12:21

## 2025-01-23 RX ADMIN — METHYLPREDNISOLONE SODIUM SUCCINATE 40 MG: 40 INJECTION, POWDER, FOR SOLUTION INTRAMUSCULAR; INTRAVENOUS at 20:18

## 2025-01-23 RX ADMIN — FINASTERIDE 5 MG: 5 TABLET, FILM COATED ORAL at 09:21

## 2025-01-23 RX ADMIN — IPRATROPIUM BROMIDE AND ALBUTEROL SULFATE 3 ML: .5; 3 SOLUTION RESPIRATORY (INHALATION) at 21:24

## 2025-01-23 RX ADMIN — CYANOCOBALAMIN TAB 1000 MCG 1000 MCG: 1000 TAB at 09:21

## 2025-01-23 RX ADMIN — Medication 2000 UNITS: at 09:21

## 2025-01-23 RX ADMIN — METHYLPREDNISOLONE SODIUM SUCCINATE 40 MG: 40 INJECTION, POWDER, FOR SOLUTION INTRAMUSCULAR; INTRAVENOUS at 04:51

## 2025-01-23 RX ADMIN — ENOXAPARIN SODIUM 30 MG: 30 INJECTION SUBCUTANEOUS at 12:21

## 2025-01-23 ASSESSMENT — COGNITIVE AND FUNCTIONAL STATUS - GENERAL
WALKING IN HOSPITAL ROOM: A LITTLE
MOVING TO AND FROM BED TO CHAIR: A LITTLE
WALKING IN HOSPITAL ROOM: A LITTLE
TOILETING: A LITTLE
CLIMB 3 TO 5 STEPS WITH RAILING: A LITTLE
DAILY ACTIVITIY SCORE: 21
STANDING UP FROM CHAIR USING ARMS: A LITTLE
DRESSING REGULAR LOWER BODY CLOTHING: A LITTLE
CLIMB 3 TO 5 STEPS WITH RAILING: A LITTLE
DAILY ACTIVITIY SCORE: 24
HELP NEEDED FOR BATHING: A LITTLE
MOBILITY SCORE: 20
CLIMB 3 TO 5 STEPS WITH RAILING: A LITTLE
MOBILITY SCORE: 20
MOVING TO AND FROM BED TO CHAIR: A LITTLE
MOBILITY SCORE: 20
STANDING UP FROM CHAIR USING ARMS: A LITTLE
MOVING TO AND FROM BED TO CHAIR: A LITTLE
STANDING UP FROM CHAIR USING ARMS: A LITTLE
WALKING IN HOSPITAL ROOM: A LITTLE

## 2025-01-23 ASSESSMENT — ACTIVITIES OF DAILY LIVING (ADL)
BATHING_ASSISTANCE: STAND BY
ADL_ASSISTANCE: INDEPENDENT

## 2025-01-23 ASSESSMENT — PAIN SCALES - GENERAL
PAINLEVEL_OUTOF10: 0 - NO PAIN

## 2025-01-23 ASSESSMENT — PAIN - FUNCTIONAL ASSESSMENT
PAIN_FUNCTIONAL_ASSESSMENT: 0-10

## 2025-01-23 NOTE — PROGRESS NOTES
01/23/25 1143   Discharge Planning   Living Arrangements Alone   Support Systems Children   Assistance Needed Met with patient regarding low intensity recommendation and patient is declining outpatient or home care therapy.  He says he walked pretty well with therapy.   Type of Residence Private residence   Home or Post Acute Services None   Expected Discharge Disposition Home   Does the patient need discharge transport arranged? No

## 2025-01-23 NOTE — CONSULTS
Vancomycin Dosing by Pharmacy- Cessation of Therapy    Consult to pharmacy for vancomycin dosing has been discontinued by the prescriber, pharmacy will sign off at this time.    Please call pharmacy if there are further questions or re-enter a consult if vancomycin is resumed.     Pushpa Velasquez, SharonD

## 2025-01-23 NOTE — PROGRESS NOTES
Occupational Therapy    Evaluation    Patient Name: Cesar Wood  MRN: [id number]  Department: [facility]  Room: [address] [place] [address]  Today's Date: 1/23/2025  Time Calculation  Start Time: 0902  Stop Time: 0917  Time Calculation (min): 15 min    Assessment  IP OT Assessment  OT Assessment: Pt is an 85 yo F referred to occupational therapy for impaired self-care and functional mobility 2/2 hospitalization for influenza A. Pt demonstrates impaired functional mobility and ADL completion this date. Pt would benefit from continued education on energy conservation techniques to use during functional tasks. Pt would benefit from continued OT services at the LOW intensity level to increase functional independence and help w/ return to PLOF.  Prognosis: Good  Barriers to Discharge Home: No anticipated barriers  Evaluation/Treatment Tolerance: Patient tolerated treatment well  Medical Staff Made Aware: Yes  End of Session Communication: Bedside nurse  End of Session Patient Position: Up in chair, Alarm on  Plan:  Treatment Interventions: ADL retraining, Functional transfer training, UE strengthening/ROM, Endurance training, Equipment evaluation/education, Compensatory technique education  OT Frequency: 2 times per week  OT Discharge Recommendations: Low intensity level of continued care  OT Recommended Transfer Status: Stand by assist, Assist of 1  OT - OK to Discharge: Yes (Based on completed evaluation and care plan recommendations, no barriers to discharge to next site of care)    Subjective   Current Problem:  1. Pneumonia of right lung due to infectious organism, unspecified part of lung        2. Influenza A        3. Shortness of breath          General:  General  Reason for Referral: Pt is an 85 yo M referred to occupational therapy for impaired self-care and functional mobility 2/2 hospitalization for influenza A. Pt presented to [place] w/ SOB.  Referred By: SCOTT Barboza-CNP  Past Medical History Relevant to Rehab: Acute  renal failure, acute hypoxic respiratory failure, pneumonia of R lung, hypothyroidism, Stage 3b chronic kidney disease, class 1 obesity, HTN  Prior to Session Communication: Bedside nurse  Patient Position Received: Bed, 2 rail up, Alarm on (Seated EOB w/ nurse supervisor)  General Comment: Pt pleasant and agreeable to therapy evaluation this date. Pt cleared by RN prior to session.  Precautions:  Medical Precautions: Fall precautions, Infection precautions (droplet precautions)  Precautions Comment: dylan meeks     Date/Time Vitals Session Patient Position Pulse Resp SpO2 BP MAP (mmHg)    01/23/25 0932 --  --  89  --  97 %  --  --     01/23/25 1019 --  --  89  --  95 %  --  --         Pt SpO2 decreased to 92% during functional mobility, increased to 95% at rest. Pt able to converse w/ therapist while ambulating (per pt, this was difficult for him a few days ago).     Pain:  Pain Assessment  Pain Assessment: 0-10  0-10 (Numeric) Pain Score: 0 - No pain    Objective   Cognition:  Overall Cognitive Status: Within Functional Limits  Arousal/Alertness: Appropriate responses to stimuli  Orientation Level: Oriented X4    Home Living:  Type of Home: House  Lives With: Alone  Home Adaptive Equipment: Cane  Home Layout: Multi-level (9 steps up and 5 steps down, 1 rail)  Home Access: Stairs to enter without rails  Entrance Stairs-Number of Steps: 2 (from garage)  Bathroom Shower/Tub: Walk-in shower  Bathroom Toilet: Standard  Bathroom Equipment: Grab bars in shower   Prior Function:  Level of [place]: Independent with ADLs and functional transfers, Independent with homemaking with ambulation  Receives Help From: Family (Pt has sons that assist w/ IADLs)  ADL Assistance: Independent  Homemaking Assistance: Needs assistance  Meal Prep: Independent  Laundry: Independent  Cleaning: Independent  Yard Work:  (Pt's son complete)  Driving/Transportation: Independent (Pt drives)  Ambulatory Assistance: Independent (Cane in  community, no AD in home)  IADL History:  Homemaking Responsibilities: Yes  Meal Prep Responsibility: Primary  Laundry Responsibility: Primary  Cleaning Responsibility: Primary  Shopping Responsibility: Primary  ADL:  Eating Assistance: Independent  Grooming Assistance: Independent  Bathing Assistance: Stand by  UE Dressing Assistance: Stand by  LE Dressing Assistance: Stand by  LE Dressing Deficit: Don/doff R sock, Don/doff L sock  Toileting Assistance with Device: Stand by  Functional Assistance: Stand by  ADL Comments: Pt completed bed mobility and STS from bed w/ close sup. Pt also completed functional mobility in hallway w/ FWW. Pt SpO2 92% during functional mobility, increase to 97% at rest. Pt also doffed/donned socks w/ setup assist. Other ADLs anticipated.  Activity Tolerance:  Endurance: Decreased tolerance for upright activites  Bed Mobility/Transfers:   Bed Mobility  Bed Mobility: Yes  Bed Mobility 1  Bed Mobility 1: Supine to sitting  Level of Assistance 1: Close supervision    Transfers  Transfer: Yes  Transfer 1  Transfer From 1: Bed to  Transfer to 1: Stand  Technique 1: Sit to stand, Stand to sit  Transfer Device 1: Walker  Transfer Level of Assistance 1: Close supervision    Functional Mobility:  Functional Mobility  Functional Mobility Performed: Yes  Functional Mobility 1  Surface 1: Level tile  Device 1: Rolling walker  Assistance 1: Contact guard  Comments 1: Pt completed functional mobility in hallway w/ CGA and FWW  Sitting Balance:  Static Sitting Balance  Static Sitting-Balance Support: Feet supported  Static Sitting-Level of Assistance: Independent  Dynamic Sitting Balance  Dynamic Sitting-Balance Support: Feet supported  Dynamic Sitting-Level of Assistance: Independent  Standing Balance:  Static Standing Balance  Static Standing-Balance Support: Bilateral upper extremity supported  Static Standing-Level of Assistance: Close supervision  Dynamic Standing Balance  Dynamic Standing-Balance  Support: Bilateral upper extremity supported  Dynamic Standing-Level of Assistance: Contact guard    IADL's:   Homemaking Responsibilities: Yes  Meal Prep Responsibility: Primary  Laundry Responsibility: Primary  Cleaning Responsibility: Primary  Shopping Responsibility: Primary    Sensation:  Light Touch: No apparent deficits  Strength:  Strength Comments: BUE grossly 4/5 during functional mobility  Coordination:  Movements are Fluid and Coordinated: Yes   Hand Function:  Hand Function  Gross Grasp: Functional  Coordination: Functional  Extremities:  RUE: Within Functional Limits  LUE: Within Functional Limits    Outcome Measures: [facility] Daily Activity  Putting on and taking off regular lower body clothing: A little  Bathing (including washing, rinsing, drying): A little  Putting on and taking off regular upper body clothing: None  Toileting, which includes using toilet, bedpan or urinal: A little  Taking care of personal grooming such as brushing teeth: None  Eating Meals: None  Daily Activity - Total Score: 21    Education Documentation  Body Mechanics, taught by Esme Barrera OT at 1/23/2025 11:01 AM.  Learner: Patient  Readiness: Acceptance  Method: Explanation  Response: Verbalizes Understanding  Comment: Pt educated on safety and body mechanics when transferring and ADL completion using energy conservation based on the POC and DC recs    ADL Training, taught by Esme Barrera OT at 1/23/2025 11:01 AM.  Learner: Patient  Readiness: Acceptance  Method: Explanation  Response: Verbalizes Understanding  Comment: Pt educated on safety and body mechanics when transferring and ADL completion using energy conservation based on the POC and DC recs    Goals:   Encounter Problems       Encounter Problems (Active)       ADLs       Patient will perform UB and LB bathing with stand by assist level of assistance and Prn bathroom equipment.       Start:  01/23/25    Expected End:  02/06/25            Patient with complete  upper body dressing with set-up level of assistance donning and doffing all UE clothes with PRN adaptive equipment while supported sitting       Start:  01/23/25    Expected End:  02/06/25            Patient with complete lower body dressing with set-up level of assistance donning and doffing all LE clothes  with PRN adaptive equipment while edge of bed  and standing       Start:  01/23/25    Expected End:  02/06/25            Patient will complete toileting including hygiene clothing management/hygiene with supervision level of assistance and PRN bathroom equipment.       Start:  01/23/25    Expected End:  02/06/25               BALANCE       Pt will maintain dynamic standing balance during ADL task with supervision level of assistance in order to demonstrate decreased risk of falling and improved postural control.       Start:  01/23/25    Expected End:  02/06/25               MOBILITY       Patient will perform Functional mobility max Household distances/Community Distances with supervision level of assistance and least restrictive device in order to improve safety and functional mobility.       Start:  01/23/25    Expected End:  02/06/25

## 2025-01-23 NOTE — CARE PLAN
The patient's goals for the shift include sleep    The clinical goals for the shift include spo2 will remain >92% on room air    Goal met. Vss. Patient denies pain. IV ATB therapy continues. Patient sat in chair and worked with therapy today. Patient resting in bed at this time with call light in reach.

## 2025-01-23 NOTE — NURSING NOTE
Critical BC results. Dr. Leon notified and N.O. Vancomycin in place. Contacted for clarification of order and waiting for response.

## 2025-01-23 NOTE — PROGRESS NOTES
Cesar Wood is a 84 y.o. male on day 1 of admission presenting with Influenza A.      Subjective   Pt assessed at bedside. Feeling much better today. Had a positive blood culture resulted overnight, likely contaminant. Repeat cultures pending. Pt is stable on RA.        Objective     Last Recorded Vitals  /84 (BP Location: Right arm, Patient Position: Lying)   Pulse 76   Temp 36.2 °C (97.2 °F) (Temporal)   Resp 18   Wt 95.7 kg (210 lb 15.7 oz)   SpO2 96%   Intake/Output last 3 Shifts:    Intake/Output Summary (Last 24 hours) at 1/23/2025 1022  Last data filed at 1/23/2025 0900  Gross per 24 hour   Intake 800 ml   Output 1775 ml   Net -975 ml       Admission Weight  Weight: 99.8 kg (220 lb) (01/21/25 0939)    Daily Weight  01/21/25 : 95.7 kg (210 lb 15.7 oz)    Image Results  ECG 12 lead  Normal sinus rhythm  Low voltage QRS  Borderline ECG  When compared with ECG of 22-SEP-2015 01:14,  T wave inversion no longer evident in Inferior leads  XR chest 1 view  Narrative: Interpreted By:  Marco Yeager,   STUDY:  XR CHEST 1 VIEW  1/21/2025 9:58 am      INDICATION:  Signs/Symptoms:sob      COMPARISON:  09/23/2015      ACCESSION NUMBER(S):  [id number]      ORDERING CLINICIAN:  ARLENE BLOCK      TECHNIQUE:  A single AP portable radiograph of the chest was obtained.      FINDINGS:  Multiple cardiac monitoring leads are seen over the chest.  Airspace  consolidations are seen in the perihilar aspect of the right breast  and at the right lung base, and may represent small pleural effusion,  atelectasis and/or pneumonia. Underlying mass cannot be excluded. No  pneumothorax is identified. The cardiac silhouette is within normal  limits for size.      Impression: Right-sided airspace consolidations, as above. Clinical correlation  and continued follow-up until clearing is recommended.      MACRO:  None.      Signed by: Marco Yeager 1/21/2025 10:17 AM  Dictation workstation:   [id number]      Physical Exam  Vitals  reviewed.   HENT:      Head: Normocephalic and atraumatic.      Right Ear: External ear normal.      Left Ear: External ear normal.      Nose: Nose normal.      Mouth/Throat:      Pharynx: Oropharynx is clear.   Eyes:      Conjunctiva/sclera: Conjunctivae normal.   Cardiovascular:      Rate and Rhythm: Normal rate and regular rhythm.      Pulses: Normal pulses.      Heart sounds: Normal heart sounds.   Pulmonary:      Breath sounds: Wheezing and rhonchi present.   Abdominal:      General: Bowel sounds are normal.      Palpations: Abdomen is soft.   Musculoskeletal:         General: Normal range of motion.      Cervical back: Normal range of motion and neck supple.   Skin:     General: Skin is dry.   Neurological:      General: No focal deficit present.      Mental Status: He is alert and oriented to person, place, and time.      Motor: Weakness present.   Psychiatric:         Mood and Affect: Mood normal.         Behavior: Behavior normal.     Relevant Results  Scheduled medications  [Held by provider] allopurinol, 100 mg, oral, Daily  aspirin, 81 mg, oral, Daily  azithromycin, 500 mg, oral, q24h EUGENIA  carvedilol, 25 mg, oral, BID  cefTRIAXone, 2 g, intravenous, q24h  cholecalciferol, 2,000 Units, oral, Daily  cyanocobalamin, 1,000 mcg, oral, Daily  enoxaparin, 30 mg, subcutaneous, q24h  finasteride, 5 mg, oral, Daily  [Held by provider] furosemide, 20 mg, oral, Daily  ipratropium-albuteroL, 3 mL, nebulization, TID  levothyroxine, 50 mcg, oral, Daily  [Held by provider] lisinopril, 10 mg, oral, Daily  methylPREDNISolone sodium succinate (PF), 40 mg, intravenous, q8h  pantoprazole, 40 mg, oral, Daily before breakfast   Or  pantoprazole, 40 mg, intravenous, Daily before breakfast  [Held by provider] pravastatin, 20 mg, oral, Daily  tamsulosin, 0.4 mg, oral, Daily      Continuous medications     PRN medications  PRN medications: acetaminophen **OR** acetaminophen **OR** acetaminophen, acetaminophen **OR** acetaminophen  **OR** acetaminophen, albuterol, benzocaine-menthol, dextromethorphan-guaifenesin, guaiFENesin, melatonin, ondansetron **OR** ondansetron, oxygen, polyethylene glycol, vancomycin    Results for orders placed or performed during the hospital encounter of 01/21/25 (from the past 24 hours)   MRSA Surveillance for Vancomycin De-escalation, PCR    Specimen: Anterior Nares; Swab   Result Value Ref Range    MRSA PCR Not Detected Not Detected   CBC   Result Value Ref Range    WBC 8.1 4.4 - 11.3 x10*3/uL    nRBC 0.0 0.0 - 0.0 /100 WBCs    RBC 4.26 (L) 4.50 - 5.90 x10*6/uL    Hemoglobin 13.1 (L) 13.5 - 17.5 g/dL    Hematocrit 38.6 (L) 41.0 - 52.0 %    MCV 91 80 - 100 fL    MCH 30.8 26.0 - 34.0 pg    MCHC 33.9 32.0 - 36.0 g/dL    RDW 13.6 11.5 - 14.5 %    Platelets 156 150 - 450 x10*3/uL   Basic Metabolic Panel   Result Value Ref Range    Glucose 224 (H) 74 - 99 mg/dL    Sodium 136 136 - 145 mmol/L    Potassium 4.4 3.5 - 5.3 mmol/L    Chloride 102 98 - 107 mmol/L    Bicarbonate 24 21 - 32 mmol/L    Anion Gap 14 10 - 20 mmol/L    Urea Nitrogen 71 (H) 6 - 23 mg/dL    Creatinine 2.09 (H) 0.50 - 1.30 mg/dL    eGFR 30 (L) >60 mL/min/1.73m*2    Calcium 8.6 8.6 - 10.3 mg/dL   SST TOP   Result Value Ref Range    Extra Tube Hold for add-ons.           Assessment/Plan      Assessment & Plan  Influenza A    Pneumonia due to infectious organism    Acute hypoxic respiratory failure (Multi)    Acute renal failure superimposed on stage 3 chronic kidney disease (Multi)    Hypertension, uncontrolled    Hypercholesterolemia    Enlarged prostate without lower urinary tract symptoms (luts)    Gout    Pneumonia of right lung due to infectious organism, unspecified part of lung    #Influenza A  #Pneumonia   #Acute hypoxic respiratory failure   -SOB x4 days   -CXR: Right-sided airspace consolidations, as above. Clinical correlation  and continued follow-up until clearing is recommended.  -WBC 8.2  -D dimer 656   -Influenza A + 1/21  -Covid/RSV neg    -Blood cx 1/2 positive for gram + cocci, clusters  -MRSA nares neg   -IV vanco (Day 1)  -Repeat blood cx pending   -Sputum cx if able   -Procal 0.23  -IV azithro, ceftriaxone (Day 3)  -Solumedrol q8  -Bronchodilators  -Supplemental oxygen to maintain an sp02 greater than 92%  -Currently on 2L > weaned to RA   -Baseline RA  -Isolation protocol     #Acute on chronic renal failure, stage III  -Baseline creat ~ 1.8 - 2.0 according to nephro   -Bun/creat 56/2.40 > 64/2.61 > 71/2.09   -Renally dose medications as able   -Hold nephrotoxic agents; allopurinol, furosemide, lisinopril, pravastatin  -Daily BMP      #Hyperkalemia, improved   -K 5.4 > 4.4   -Lokelma 10gm TID x1 day  -Daily BMP      #Hyperglycemia  -Glucose on admission 241  -No hx of DM  -No A1C on file  -Hgb A1C 6.1  -Follow up with PCP; instructed patient      #Essential HTN  #HLD   #Chronic leg swelling   -Continue ASA, carvedilol  -Monitor BP and HR      #BPH  -Continue finasteride, tamsulosin  -Bladder scan PRN     #Gout, not in acute flare   -Continue allopurinol; on hold currently      DVT ppx  -lovenox      PUD ppx  -pantoprazole     F: PRN  E: Replete per protocol  N: Regular  A: PIV     Disposition: Pt requires more than 2 inpatient days at this time   Code Status: Full Code         Elise Mchugh, APRN-CNP

## 2025-01-23 NOTE — CARE PLAN
"The patient's goals for the shift include sleep    The clinical goals for the shift include Patient's SPO2 will remain >90% this shift    Over the shift, the patient's SPO2 has remained 93-96%. Patient denies pain. Does state he feels he is having an  \"easier time breathing\". New order vancomycin started and clarified with overnight provider. Patient absent of s/s adverse reaction. PCR specimen obtained. Patient continues in Droplet precautions. VSS and patient is afebrile. Patient resting in bed with call light within reach.     "

## 2025-01-23 NOTE — PROGRESS NOTES
Physical Therapy    Physical Therapy Evaluation    Patient Name: Cesar Wood  MRN: [id number]  Department: [facility]  Room: [address] [place] [address]  Today's Date: 1/23/2025   Time Calculation  Start Time: 0932  Stop Time: 0950  Time Calculation (min): 18 min    Assessment/Plan   PT Assessment  PT Assessment Results: Decreased strength, Impaired balance, Decreased mobility, Decreased endurance, Impaired hearing  Rehab Prognosis: Good  Barriers to Discharge Home: No anticipated barriers  Evaluation/Treatment Tolerance: Patient tolerated treatment well  Strengths: Ability to acquire knowledge  Barriers to Participation:  (n/a)  Assessment Comment: Pleasant 84 y.o presents with weakness and impaired mobility. Pt. lives alone and is normally IND. Pt. currently requires close supervision with WW and would benefit from additional PT to address above noted limitations and prevent further decline.  End of Session Patient Position: Up in chair, Alarm on  IP OR SWING BED PT PLAN  Inpatient or Swing Bed: Inpatient  PT Plan  Treatment/Interventions: Transfer training, Bed mobility, Gait training, Stair training, Balance training, Strengthening, Endurance training, Therapeutic exercise, Therapeutic activity, Home exercise program, Neuromuscular re-education  PT Plan: Ongoing PT  PT Frequency: 3 times per week  PT Discharge Recommendations: Low intensity level of continued care  Equipment Recommended upon Discharge: Wheeled walker (pt. declining)  PT Recommended Transfer Status: Assist x1  PT - OK to Discharge: Yes Based on completed evaluation and care plan recommendations, no barriers to discharge to next site of care      Subjective   General Visit Information:  General  Reason for Referral: impaired mobility, influenza A  Referred By: PATO Barboza  Past Medical History Relevant to Rehab: HTN  Prior to Session Communication: Bedside nurse  Patient Position Received: Up in chair, Alarm on  General Comment: masimo, IV  Home  Living:  Home Living  Type of Home: House  Lives With: Alone  Home Adaptive Equipment: Cane (wife's WW)  Home Layout: Multi-level (9+5 steps inside home with 1 rail)  Home Access: Stairs to enter with rails  Entrance Stairs-Rails: None  Entrance Stairs-Number of Steps: 2  Bathroom Shower/Tub: Walk-in shower  Bathroom Toilet: Standard  Bathroom Equipment: Grab bars in shower  Prior Level of Function:  Prior Function Per Pt/Caregiver Report  Level of Bee: Independent with ADLs and functional transfers, Independent with homemaking with ambulation  Prior Function Comments: +drives  Precautions:  Precautions  Medical Precautions: Fall precautions, Infection precautions      Date/Time Vitals Session Patient Position Pulse Resp SpO2 BP MAP (mmHg)    01/23/25 0932 --  --  89  --  97 %  --  --     01/23/25 1019 --  --  89  --  95 %  --  --                 Objective   Pain:  Pain Assessment  Pain Assessment: 0-10  0-10 (Numeric) Pain Score: 0 - No pain  Cognition:  Cognition  Overall Cognitive Status: Within Functional Limits    General Assessments:     Activity Tolerance  Endurance: Decreased tolerance for upright activites    Sensation  Sensation Comment: denies deficits    Strength  Strength Comments: BLE: grossly 4/5 (able to complete 5 sit to stands without UE support in 18 sec)  Coordination  Movements are Fluid and Coordinated: Yes         Static Standing Balance  Static Standing-Comment/Number of Minutes: good-; pt. is reaching for furniture when standing; recommend AD at this time  Dynamic Standing Balance  Dynamic Standing-Comments: good-  Functional Assessments:  Bed Mobility  Bed Mobility: No    Transfers  Transfer: Yes  Transfer 1  Technique 1: Sit to stand, Stand to sit  Transfer Level of Assistance 1: Close supervision    Ambulation/Gait Training  Ambulation/Gait Training Performed: Yes  Ambulation/Gait Training 1  Surface 1: Level tile  Device 1: Rolling walker (attempted to amb. without AD but pt.  unsteady and reaching for furniture. Recommed AD at this time.)  Assistance 1: Close supervision  Quality of Gait 1:  (needed cues to keep LORRAINE inside walker)  Comments/Distance (ft) 1: 150  Extremity/Trunk Assessments:  RLE   RLE : Within Functional Limits  LLE   LLE : Within Functional Limits  Outcome Measures:    Other Measures  5x Sit to Stand: 18 seconds    Encounter Problems       Encounter Problems (Active)       Balance       STG - Maintains dynamic standing balance without UE support with >=good balance        Start:  01/23/25    Expected End:  02/06/25               Mobility       LTG - Patient will ambulate community distance SAMI with LRD       Start:  01/23/25    Expected End:  02/06/25            STG - Patient will ascend and descend a flight of stairs IND with 1 rail        Start:  01/23/25    Expected End:  02/06/25               PT Transfers       STG - Patient will transfer sit to and from stand SAMI with LRD       Start:  01/23/25    Expected End:  02/06/25               Pain       Pt. will complete 5 sit to stands without UE support <15 seconds        Start:  01/23/25    Expected End:  02/06/25               Pain - Adult              Education Documentation  Mobility Training, taught by Alexandria Hedrick, PT at 1/23/2025 10:31 AM.  Learner: Patient  Readiness: Acceptance  Method: Explanation  Response: Verbalizes Understanding  Comment: Educated pt. on PT POC    Education Comments  No comments found.

## 2025-01-24 LAB
ANION GAP SERPL CALC-SCNC: 15 MMOL/L (ref 10–20)
BUN SERPL-MCNC: 74 MG/DL (ref 6–23)
CALCIUM SERPL-MCNC: 8.8 MG/DL (ref 8.6–10.3)
CHLORIDE SERPL-SCNC: 105 MMOL/L (ref 98–107)
CO2 SERPL-SCNC: 24 MMOL/L (ref 21–32)
CREAT SERPL-MCNC: 2.11 MG/DL (ref 0.5–1.3)
EGFRCR SERPLBLD CKD-EPI 2021: 30 ML/MIN/1.73M*2
ERYTHROCYTE [DISTWIDTH] IN BLOOD BY AUTOMATED COUNT: 13.3 % (ref 11.5–14.5)
GLUCOSE SERPL-MCNC: 244 MG/DL (ref 74–99)
HCT VFR BLD AUTO: 37.5 % (ref 41–52)
HGB BLD-MCNC: 12.3 G/DL (ref 13.5–17.5)
MCH RBC QN AUTO: 29.6 PG (ref 26–34)
MCHC RBC AUTO-ENTMCNC: 32.8 G/DL (ref 32–36)
MCV RBC AUTO: 90 FL (ref 80–100)
NRBC BLD-RTO: 0 /100 WBCS (ref 0–0)
PLATELET # BLD AUTO: 173 X10*3/UL (ref 150–450)
POTASSIUM SERPL-SCNC: 4.5 MMOL/L (ref 3.5–5.3)
RBC # BLD AUTO: 4.15 X10*6/UL (ref 4.5–5.9)
SODIUM SERPL-SCNC: 139 MMOL/L (ref 136–145)
WBC # BLD AUTO: 8 X10*3/UL (ref 4.4–11.3)

## 2025-01-24 PROCEDURE — 94640 AIRWAY INHALATION TREATMENT: CPT | Mod: IPSPLIT

## 2025-01-24 PROCEDURE — 80048 BASIC METABOLIC PNL TOTAL CA: CPT | Mod: IPSPLIT

## 2025-01-24 PROCEDURE — 85027 COMPLETE CBC AUTOMATED: CPT | Mod: IPSPLIT

## 2025-01-24 PROCEDURE — 1200000002 HC GENERAL ROOM WITH TELEMETRY DAILY: Mod: IPSPLIT

## 2025-01-24 PROCEDURE — 97116 GAIT TRAINING THERAPY: CPT | Mod: GP,IPSPLIT | Performed by: PHYSICAL THERAPIST

## 2025-01-24 PROCEDURE — 2500000004 HC RX 250 GENERAL PHARMACY W/ HCPCS (ALT 636 FOR OP/ED): Mod: IPSPLIT

## 2025-01-24 PROCEDURE — 36415 COLL VENOUS BLD VENIPUNCTURE: CPT | Mod: IPSPLIT

## 2025-01-24 PROCEDURE — 94760 N-INVAS EAR/PLS OXIMETRY 1: CPT | Mod: IPSPLIT

## 2025-01-24 PROCEDURE — 2500000002 HC RX 250 W HCPCS SELF ADMINISTERED DRUGS (ALT 637 FOR MEDICARE OP, ALT 636 FOR OP/ED): Mod: IPSPLIT

## 2025-01-24 PROCEDURE — 94669 MECHANICAL CHEST WALL OSCILL: CPT | Mod: IPSPLIT

## 2025-01-24 PROCEDURE — 94668 MNPJ CHEST WALL SBSQ: CPT | Mod: IPSPLIT

## 2025-01-24 PROCEDURE — 97112 NEUROMUSCULAR REEDUCATION: CPT | Mod: GP,IPSPLIT | Performed by: PHYSICAL THERAPIST

## 2025-01-24 PROCEDURE — 99232 SBSQ HOSP IP/OBS MODERATE 35: CPT | Performed by: NURSE PRACTITIONER

## 2025-01-24 PROCEDURE — 2500000001 HC RX 250 WO HCPCS SELF ADMINISTERED DRUGS (ALT 637 FOR MEDICARE OP): Mod: IPSPLIT

## 2025-01-24 RX ORDER — CEFUROXIME AXETIL 250 MG/1
500 TABLET ORAL EVERY 24 HOURS
Status: DISCONTINUED | OUTPATIENT
Start: 2025-01-25 | End: 2025-01-25 | Stop reason: HOSPADM

## 2025-01-24 RX ADMIN — CEFTRIAXONE 2 G: 2 INJECTION, SOLUTION INTRAVENOUS at 08:37

## 2025-01-24 RX ADMIN — IPRATROPIUM BROMIDE AND ALBUTEROL SULFATE 3 ML: .5; 3 SOLUTION RESPIRATORY (INHALATION) at 11:13

## 2025-01-24 RX ADMIN — TAMSULOSIN HYDROCHLORIDE 0.4 MG: 0.4 CAPSULE ORAL at 08:39

## 2025-01-24 RX ADMIN — IPRATROPIUM BROMIDE AND ALBUTEROL SULFATE 3 ML: .5; 3 SOLUTION RESPIRATORY (INHALATION) at 15:03

## 2025-01-24 RX ADMIN — CARVEDILOL 25 MG: 25 TABLET, FILM COATED ORAL at 08:39

## 2025-01-24 RX ADMIN — FINASTERIDE 5 MG: 5 TABLET, FILM COATED ORAL at 08:38

## 2025-01-24 RX ADMIN — CARVEDILOL 25 MG: 25 TABLET, FILM COATED ORAL at 16:07

## 2025-01-24 RX ADMIN — LEVOTHYROXINE SODIUM 50 MCG: 0.05 TABLET ORAL at 08:39

## 2025-01-24 RX ADMIN — ASPIRIN 81 MG: 81 TABLET, COATED ORAL at 08:39

## 2025-01-24 RX ADMIN — METHYLPREDNISOLONE SODIUM SUCCINATE 40 MG: 40 INJECTION, POWDER, FOR SOLUTION INTRAMUSCULAR; INTRAVENOUS at 11:42

## 2025-01-24 RX ADMIN — METHYLPREDNISOLONE SODIUM SUCCINATE 40 MG: 40 INJECTION, POWDER, FOR SOLUTION INTRAMUSCULAR; INTRAVENOUS at 04:58

## 2025-01-24 RX ADMIN — METHYLPREDNISOLONE SODIUM SUCCINATE 40 MG: 40 INJECTION, POWDER, FOR SOLUTION INTRAMUSCULAR; INTRAVENOUS at 19:50

## 2025-01-24 RX ADMIN — GUAIFENESIN 600 MG: 600 TABLET, EXTENDED RELEASE ORAL at 15:43

## 2025-01-24 RX ADMIN — ALBUTEROL SULFATE 2.5 MG: 2.5 SOLUTION RESPIRATORY (INHALATION) at 04:45

## 2025-01-24 RX ADMIN — PANTOPRAZOLE SODIUM 40 MG: 40 TABLET, DELAYED RELEASE ORAL at 06:39

## 2025-01-24 RX ADMIN — Medication 2000 UNITS: at 08:38

## 2025-01-24 RX ADMIN — IPRATROPIUM BROMIDE AND ALBUTEROL SULFATE 3 ML: .5; 3 SOLUTION RESPIRATORY (INHALATION) at 21:10

## 2025-01-24 RX ADMIN — CYANOCOBALAMIN TAB 1000 MCG 1000 MCG: 1000 TAB at 08:39

## 2025-01-24 RX ADMIN — AZITHROMYCIN DIHYDRATE 500 MG: 250 TABLET, FILM COATED ORAL at 08:38

## 2025-01-24 RX ADMIN — ENOXAPARIN SODIUM 30 MG: 30 INJECTION SUBCUTANEOUS at 11:42

## 2025-01-24 ASSESSMENT — BALANCE ASSESSMENTS
SITTING WITH BACK UNSUPPORTED BUT FEET SUPPORTED ON FLOOR OR ON A STOOL: ABLE TO SIT SAFELY AND SECURELY FOR 2 MINUTES
REACHING FORWARD WITH OUTSTRETCHED ARM WHILE STANDING: CAN REACH FORWARD 12 CM (5 INCHES)
TURN 360 DEGREES: ABLE TO TURN 360 DEGREES SAFELY BUT SLOWLY
STANDING UNSUPPORTED ONE FOOT IN FRONT: NEEDS HELP TO STEP BUT CAN HOLD 15 SECONDS
STANDING UNSUPPORTED WITH EYES CLOSED: ABLE TO STAND 10 SECONDS WITH SUPERVISION
PICK UP OBJECT FROM THE FLOOR FROM A STANDING POSITION: ABLE TO PICK UP SLIPPER BUT NEEDS SUPERVISION
PLACE ALTERNATE FOOT ON STEP OR STOOL WHILE STANDING UNSUPPORTED: ABLE TO COMPLETE 4 STEPS WITHOUT AID WITH SUPERVISION
PLACE ALTERNATE FOOT ON STEP OR STOOL WHILE STANDING UNSUPPORTED: LOOKS BEHIND FROM BOTH SIDES AND WEIGHT SHIFTS WELL
STANDING TO SITTING: ABLE TO STAND WITHOUT USING HANDS AND STABILIZE INDEPENDENTLY
STANDING UNSUPPORTED: ABLE TO STAND SAFELY FOR 2 MINUTES
LONG VERSION TOTAL SCORE (MAX 56): 39
STANDING UNSUPPORTED WITH FEET TOGETHER: ABLE TO PLACE FEET TOGETHER INDEPENDENTLY AND STAND 1 MINUTE WITH SUPERVISION
STANDING TO SITTING: SITS SAFELY WITH MINIMAL USE OF HANDS
TRANSFERS: ABLE TO TRANSFER WITH VERBAL CUEING AND/OR SUPERVISION
STANDING ON ONE LEG: UNABLE TO TRY NEEDS ASSIST TO PREVENT FALL

## 2025-01-24 ASSESSMENT — COGNITIVE AND FUNCTIONAL STATUS - GENERAL
MOVING TO AND FROM BED TO CHAIR: A LITTLE
MOBILITY SCORE: 20
WALKING IN HOSPITAL ROOM: A LITTLE
STANDING UP FROM CHAIR USING ARMS: A LITTLE
DAILY ACTIVITIY SCORE: 24
MOBILITY SCORE: 24
CLIMB 3 TO 5 STEPS WITH RAILING: A LITTLE

## 2025-01-24 ASSESSMENT — PAIN SCALES - GENERAL: PAINLEVEL_OUTOF10: 0 - NO PAIN

## 2025-01-24 NOTE — PROGRESS NOTES
"Occupational Therapy                 Therapy Communication Note    Patient Name: Cesar Wood  MRN: [id number]  Department: [facility]  Room: [address] [place], [place] [address]  Today's Date: 1/24/2025     Discipline: Occupational Therapy    OT Missed Visit: Yes     Missed Visit Reason: Missed Visit Reason: Patient refused    Missed Time: Attempt    Comment: Pt lying in bed, eating lunch and watching tv. Not agreeable to work with therapy as he \"already worked out with therapy earlier and got a pretty good workout in\". Pt wanting to rest. Will follow-up as schedule permits.   "

## 2025-01-24 NOTE — PROGRESS NOTES
01/24/25 1247   Discharge Planning   Living Arrangements Alone   Support Systems Children   Assistance Needed Re-discussed LOW intensity recommendation from therapy with patient (son present)  and he declined again.  PT [facility] 20.  ADOD for patient 1/25.   Type of Residence Private residence   Home or Post Acute Services None  (declined recommendation  for home care/outpatient therapy)   Expected Discharge Disposition Home   Does the patient need discharge transport arranged? No   Intensity of Service   Intensity of Service 0-30 min     12:45 pm   IMM letter given and explained to patient. Consent/signature obtained on IPAD and copy given to patient.     DC PLAN:  Home no needs  secure

## 2025-01-24 NOTE — PROGRESS NOTES
"Cesar Wood is a 84 y.o. male on day 2 of admission presenting with Influenza A.      Subjective   \"I am feeling so much better and I look forward to walking in the chavez when the staff has time\"   Pt denies chest pain - n/v/c/d    Objective     Last Recorded Vitals  /70 (BP Location: Right arm, Patient Position: Lying)   Pulse 78   Temp 36.7 °C (98.1 °F) (Temporal)   Resp 16   Wt 95.7 kg (210 lb 15.7 oz)   SpO2 95%   Intake/Output last 3 Shifts:    Intake/Output Summary (Last 24 hours) at 1/24/2025 1521  Last data filed at 1/23/2025 2313  Gross per 24 hour   Intake 360 ml   Output 800 ml   Net -440 ml       Admission Weight  Weight: 99.8 kg (220 lb) (01/21/25 0939)    Daily Weight  01/21/25 : 95.7 kg (210 lb 15.7 oz)    Image Results  ECG 12 lead  Normal sinus rhythm  Low voltage QRS  Borderline ECG  When compared with ECG of 22-SEP-2015 01:14,  T wave inversion no longer evident in Inferior leads  XR chest 1 view  Narrative: Interpreted By:  Marco Yeager,   STUDY:  XR CHEST 1 VIEW  1/21/2025 9:58 am      INDICATION:  Signs/Symptoms:sob      COMPARISON:  09/23/2015      ACCESSION NUMBER(S):  [id number]      ORDERING CLINICIAN:  ARLENE BLOCK      TECHNIQUE:  A single AP portable radiograph of the chest was obtained.      FINDINGS:  Multiple cardiac monitoring leads are seen over the chest.  Airspace  consolidations are seen in the perihilar aspect of the right breast  and at the right lung base, and may represent small pleural effusion,  atelectasis and/or pneumonia. Underlying mass cannot be excluded. No  pneumothorax is identified. The cardiac silhouette is within normal  limits for size.      Impression: Right-sided airspace consolidations, as above. Clinical correlation  and continued follow-up until clearing is recommended.      MACRO:  None.      Signed by: Marco Yeager 1/21/2025 10:17 AM  Dictation workstation:   [id number]      Physical Exam  Constitutional:       Appearance: He is obese. "   HENT:      Head: Normocephalic.      Mouth/Throat:      Mouth: Mucous membranes are moist.   Eyes:      Extraocular Movements: Extraocular movements intact.   Cardiovascular:      Rate and Rhythm: Normal rate and regular rhythm.      Pulses: Normal pulses.      Heart sounds: Normal heart sounds.   Pulmonary:      Effort: Pulmonary effort is normal.      Breath sounds: Wheezing present.      Comments: Wheezing can be heard at the bedside without a stethoscope   Abdominal:      General: Bowel sounds are normal.      Palpations: Abdomen is soft.   Musculoskeletal:         General: Normal range of motion.      Cervical back: Normal range of motion.   Skin:     General: Skin is warm and dry.      Capillary Refill: Capillary refill takes less than 2 seconds.   Neurological:      General: No focal deficit present.      Mental Status: He is alert and oriented to person, place, and time.   Psychiatric:         Mood and Affect: Mood normal.         Behavior: Behavior normal.         Relevant Results           Scheduled medications  [Held by provider] allopurinol, 100 mg, oral, Daily  aspirin, 81 mg, oral, Daily  azithromycin, 500 mg, oral, q24h EUGENIA  carvedilol, 25 mg, oral, BID  [START ON 1/25/2025] cefuroxime, 500 mg, oral, q24h  cholecalciferol, 2,000 Units, oral, Daily  cyanocobalamin, 1,000 mcg, oral, Daily  enoxaparin, 30 mg, subcutaneous, q24h  finasteride, 5 mg, oral, Daily  [Held by provider] furosemide, 20 mg, oral, Daily  ipratropium-albuteroL, 3 mL, nebulization, TID  levothyroxine, 50 mcg, oral, Daily  [Held by provider] lisinopril, 10 mg, oral, Daily  methylPREDNISolone sodium succinate (PF), 40 mg, intravenous, q8h  pantoprazole, 40 mg, oral, Daily before breakfast   Or  pantoprazole, 40 mg, intravenous, Daily before breakfast  [Held by provider] pravastatin, 20 mg, oral, Daily  tamsulosin, 0.4 mg, oral, Daily      Continuous medications     PRN medications  PRN medications: acetaminophen **OR** acetaminophen  **OR** acetaminophen, acetaminophen **OR** acetaminophen **OR** acetaminophen, albuterol, benzocaine-menthol, dextromethorphan-guaifenesin, guaiFENesin, melatonin, ondansetron **OR** ondansetron, oxygen, polyethylene glycol  Results for orders placed or performed during the hospital encounter of 01/21/25 (from the past 24 hours)   CBC   Result Value Ref Range    WBC 8.0 4.4 - 11.3 x10*3/uL    nRBC 0.0 0.0 - 0.0 /100 WBCs    RBC 4.15 (L) 4.50 - 5.90 x10*6/uL    Hemoglobin 12.3 (L) 13.5 - 17.5 g/dL    Hematocrit 37.5 (L) 41.0 - 52.0 %    MCV 90 80 - 100 fL    MCH 29.6 26.0 - 34.0 pg    MCHC 32.8 32.0 - 36.0 g/dL    RDW 13.3 11.5 - 14.5 %    Platelets 173 150 - 450 x10*3/uL   Basic Metabolic Panel   Result Value Ref Range    Glucose 244 (H) 74 - 99 mg/dL    Sodium 139 136 - 145 mmol/L    Potassium 4.5 3.5 - 5.3 mmol/L    Chloride 105 98 - 107 mmol/L    Bicarbonate 24 21 - 32 mmol/L    Anion Gap 15 10 - 20 mmol/L    Urea Nitrogen 74 (H) 6 - 23 mg/dL    Creatinine 2.11 (H) 0.50 - 1.30 mg/dL    eGFR 30 (L) >60 mL/min/1.73m*2    Calcium 8.8 8.6 - 10.3 mg/dL     ECG 12 lead    Result Date: 1/21/2025  Normal sinus rhythm Low voltage QRS Borderline ECG When compared with ECG of 22-SEP-2015 01:14, T wave inversion no longer evident in Inferior leads    XR chest 1 view    Result Date: 1/21/2025  Interpreted By:  Marco Yeager, STUDY: XR CHEST 1 VIEW  1/21/2025 9:58 am   INDICATION: Signs/Symptoms:sob   COMPARISON: 09/23/2015   ACCESSION NUMBER(S): [id number]   ORDERING CLINICIAN: ARLENE BLOCK   TECHNIQUE: A single AP portable radiograph of the chest was obtained.   FINDINGS: Multiple cardiac monitoring leads are seen over the chest.  Airspace consolidations are seen in the perihilar aspect of the right breast and at the right lung base, and may represent small pleural effusion, atelectasis and/or pneumonia. Underlying mass cannot be excluded. No pneumothorax is identified. The cardiac silhouette is within normal limits for size.        Right-sided airspace consolidations, as above. Clinical correlation and continued follow-up until clearing is recommended.   MACRO: None.   Signed by: Marco Yeager 1/21/2025 10:17 AM Dictation workstation:   [id number]       Assessment/Plan      Assessment & Plan  Influenza A    Pneumonia due to infectious organism    Acute hypoxic respiratory failure (Multi)    Acute renal failure superimposed on stage 3 chronic kidney disease (Multi)    Hypertension, uncontrolled    Hypercholesterolemia    Enlarged prostate without lower urinary tract symptoms (luts)    Gout    Pneumonia of right lung due to infectious organism, unspecified part of lung    #Influenza A  #Pneumonia   #Acute hypoxic respiratory failure   -SOB x4 days   -CXR: Right-sided airspace consolidations, as above. Clinical correlation  and continued follow-up until clearing is recommended.  -WBC 8.2  -D dimer 656   -Influenza A + 1/21  -Covid/RSV neg   -Blood cx 1/2 positive for gram + cocci, clusters - staph epidermidis - likely contaminant   -MRSA nares neg   -IV vanco (Day 1)  -Repeat blood cx pending   -Sputum cx if able   -Procal 0.23  -IV azithro, ceftriaxone (Day 4)  -Solumedrol q8  -Bronchodilators  -Supplemental oxygen to maintain an sp02 greater than 92%  -Currently on 2L > weaned to RA   -Baseline RA  -Isolation protocol     #Acute on chronic renal failure, stage III  -Baseline creat ~ 1.8 - 2.0 according to nephro   -Bun/creat 56/2.40 > 64/2.61 > 71/2.09 > 74/2.11  -Renally dose medications as able   -Hold nephrotoxic agents; allopurinol, furosemide, lisinopril, pravastatin  -Daily BMP      #Hyperkalemia, improved   -K 5.4 > 4.4 >4.5  -Lokelma 10gm TID x1 day  -Daily BMP      #Hyperglycemia  -Glucose on admission 241  -No hx of DM  -No A1C on file  -Hgb A1C 6.1  -Follow up with PCP; instructed patient      #Essential HTN  #HLD   #Chronic leg swelling   -Continue ASA, carvedilol  -Monitor BP and HR      #BPH  -Continue finasteride,  tamsulosin  -Bladder scan PRN     #Gout, not in acute flare   -Continue allopurinol; on hold currently      DVT ppx  -lovenox      PUD ppx  -pantoprazole     F: PRN  E: Replete per protocol  N: Regular  A: PIV     Disposition: Pt requires more than 2 inpatient days at this time   Code Status: Full Code               Concepción Pathak, APRN-CNP

## 2025-01-24 NOTE — CARE PLAN
Problem: Pain - Adult  Goal: Verbalizes/displays adequate comfort level or baseline comfort level  Outcome: Progressing     Problem: Safety - Adult  Goal: Free from fall injury  Outcome: Progressing     Problem: Discharge Planning  Goal: Discharge to home or other facility with appropriate resources  Outcome: Progressing     Problem: Chronic Conditions and Co-morbidities  Goal: Patient's chronic conditions and co-morbidity symptoms are monitored and maintained or improved  Outcome: Progressing     Problem: Nutrition  Goal: Oral intake greater than 50%  Outcome: Progressing  Goal: Consume prescribed supplement  Outcome: Progressing  Goal: Adequate PO fluid intake  Outcome: Progressing  Goal: Lab values WNL  Outcome: Progressing  Goal: Maintain stable weight  Outcome: Progressing   The patient's goals for the shift include sleep    The clinical goals for the shift include Pt will continue to use aerobika to facilitate sputum mobility.

## 2025-01-24 NOTE — CARE PLAN
The patient's goals for the shift include sleep    The clinical goals for the shift include Patient's SPO2 will remain >92% on room air this shift    Over the shift, the patient's SPO2 remained 93-96% on room air. Patient denies pain and afebrile. Droplet precautions continue. VSS. Patient is resting in bed with call light within reach.

## 2025-01-24 NOTE — PROGRESS NOTES
Physical Therapy    Physical Therapy Treatment    Patient Name: Cesar Wood  MRN: [id number]  Department: [facility]  Room: [address] [place] [address]  Today's Date: 1/24/2025  Time Calculation  Start Time: 0926  Stop Time: 1002  Time Calculation (min): 36 min       Assessment/Plan   PT Assessment  Evaluation/Treatment Tolerance: Patient tolerated treatment well  Strengths: Ability to acquire knowledge  Assessment Comment: Pt. making progress towards goals. Pt. does not want to use a walker for support but agreeable to cane.  End of Session Patient Position: Up in bathroom     PT Plan  Treatment/Interventions: Transfer training, Bed mobility, Gait training, Stair training, Balance training, Strengthening, Endurance training, Therapeutic exercise, Therapeutic activity, Home exercise program, Neuromuscular re-education  PT Plan: Ongoing PT  PT Frequency: 3 times per week  PT Discharge Recommendations: Low intensity level of continued care  Equipment Recommended upon Discharge: Wheeled walker (pt. declining)  PT Recommended Transfer Status: Stand by assist  PT - OK to Discharge: Yes      General Visit Information:   PT  Visit  PT Received On: 01/24/25  Response to Previous Treatment: Patient with no complaints from previous session.  General  Patient Position Received: Bed, 2 rail up, Alarm on  General Comment: jeanna    Subjective   Precautions:  Precautions  Medical Precautions: Fall precautions, Infection precautions     Date/Time Vitals Session Patient Position Pulse Resp SpO2 BP MAP (mmHg)    01/24/25 0926 --  --  91  --  95 %  --  --                Objective   Pain: no pain noted      Cognition:  Cognition  Overall Cognitive Status: Within Functional Limits  Arousal/Alertness: Appropriate responses to stimuli  Orientation Level: Oriented X4  Coordination:     Postural Control:  Dynamic Standing Balance  Dynamic Standing-Comments: good-; recommend SC for amb. at this time.  Activity Tolerance:  Activity Tolerance  Endurance:  Decreased tolerance for upright activities (dyspnea with activity noted)  Treatments:  Therapeutic Exercise  Therapeutic Exercise Performed: Yes  Therapeutic Exercise Activity 1: B ankle pumps 10x3; B hip flexion 10x3, LAQ 10x3 (sit to stand 6x in 30 seconds without UE support)    Balance/Neuromuscular Re-Education  Balance/Neuromuscular Re-Education Activity Performed: Yes  Balance/Neuromuscular Re-Education Activity 1: See Aguillon Balance    Bed Mobility  Bed Mobility: Yes  Bed Mobility 1  Bed Mobility 1: Supine to sitting  Level of Assistance 1: Modified independent    Ambulation/Gait Training  Ambulation/Gait Training Performed: Yes  Ambulation/Gait Training 1  Device 1:  (with SC and with WW)  Assistance 1: Close supervision  Quality of Gait 1:  (VC's to keep LORRAINE inside walker)  Comments/Distance (ft) 1: 150' with WW; 150' x 2 with SC  Transfer 1  Transfer Device 1:  (walker and SC)  Transfer Level of Assistance 1: Close supervision  Trials/Comments 1: multiple trials    Stairs  Stairs: Yes  Stairs  Device 1: Railing, Single point cane  Support Devices 1: Gait belt  Assistance 1: Close supervision  Comment/Number of Steps 1: 5x 2    Outcome Measures:  [facility] Basic Mobility  Turning from your back to your side while in a flat bed without using bedrails: None  Moving from lying on your back to sitting on the side of a flat bed without using bedrails: None  Moving to and from bed to chair (including a wheelchair): A little  Standing up from a chair using your arms (e.g. wheelchair or bedside chair): A little  To walk in hospital room: A little  Climbing 3-5 steps with railing: A little  Basic Mobility - Total Score: 20    Aguillon Balance Scale  1. Sitting to Standing: Able to stand without using hands and stabilize independently  2. Standing Unsupported: Able to stand safely for 2 minutes  3. Sitting with Back Unsupported but Feet Supported on Floor or on a Stool: Able to sit safely and securely for 2 minutes  4.  Standing to Sitting: Sits safely with minimal use of hands  5.  Transfers: Able to transfer with verbal cueing and/or supervision  6. Standing Unsupported with Eyes Closed: Able to stand 10 seconds with supervision  7. Standing Unsupported with Feet Together: Able to place feet together independently and stand 1 minute with supervision  8. Reach Forward with Outstretched Arm While Standing: Can reach forward 12 cm (5 inches)  9.  Object from Floor from a Standing Position: Able to  slipper but needs supervision  10. Turning to Look Behind Over Left and Right Shoulders While Standing: Looks behind from both sides and weight shifts well  11. Turn 360 Degrees: Able to turn 360 degrees safely but slowly  12. Place Alternate Foot on Step or Stool While Standing Unsupported: Able to complete 4 steps without aid with supervision  13. Standing Unsupported One Foot in Front: Needs help to step but can hold 15 seconds  14. Standing on One Leg: Unable to try needs assist to prevent fall  Aguillon Balance Score: 39    Other Measures  5x Sit to Stand: 5    Education Documentation  Mobility Training, taught by Alexandria Hedrick, PT at 1/24/2025 10:23 AM.  Learner: Patient  Readiness: Acceptance  Method: Explanation  Response: Verbalizes Understanding  Comment: Educated pt. on importance of AD when amb. at this time    Education Comments  No comments found.        OP EDUCATION:       Encounter Problems       Encounter Problems (Active)       Balance       STG - Maintains dynamic standing balance without UE support with >=good balance  (Progressing)       Start:  01/23/25    Expected End:  02/06/25               Balance       Pt will have >=41 Aguillon Balance Score        Start:  01/24/25    Expected End:  02/06/25               Mobility       LTG - Patient will ambulate community distance SAMI with LRD (Progressing)       Start:  01/23/25    Expected End:  02/06/25            STG - Patient will ascend and descend a flight of  stairs IND with 1 rail  (Progressing)       Start:  01/23/25    Expected End:  02/06/25               PT Transfers       STG - Patient will transfer sit to and from stand SAMI with LRD (Progressing)       Start:  01/23/25    Expected End:  02/06/25               Pain       Pt. will complete 5 sit to stands without UE support <15 seconds        Start:  01/23/25    Expected End:  02/06/25               Pain - Adult

## 2025-01-25 VITALS
TEMPERATURE: 97.9 F | BODY MASS INDEX: 31.25 KG/M2 | HEIGHT: 69 IN | DIASTOLIC BLOOD PRESSURE: 84 MMHG | OXYGEN SATURATION: 92 % | WEIGHT: 210.98 LBS | SYSTOLIC BLOOD PRESSURE: 138 MMHG | HEART RATE: 78 BPM | RESPIRATION RATE: 24 BRPM

## 2025-01-25 LAB
ANION GAP SERPL CALC-SCNC: 13 MMOL/L (ref 10–20)
BACTERIA BLD CULT: NORMAL
BUN SERPL-MCNC: 68 MG/DL (ref 6–23)
CALCIUM SERPL-MCNC: 8.7 MG/DL (ref 8.6–10.3)
CHLORIDE SERPL-SCNC: 104 MMOL/L (ref 98–107)
CO2 SERPL-SCNC: 26 MMOL/L (ref 21–32)
CREAT SERPL-MCNC: 1.98 MG/DL (ref 0.5–1.3)
EGFRCR SERPLBLD CKD-EPI 2021: 32 ML/MIN/1.73M*2
ERYTHROCYTE [DISTWIDTH] IN BLOOD BY AUTOMATED COUNT: 13.3 % (ref 11.5–14.5)
GLUCOSE SERPL-MCNC: 241 MG/DL (ref 74–99)
HCT VFR BLD AUTO: 37.1 % (ref 41–52)
HGB BLD-MCNC: 12.4 G/DL (ref 13.5–17.5)
MCH RBC QN AUTO: 30.3 PG (ref 26–34)
MCHC RBC AUTO-ENTMCNC: 33.4 G/DL (ref 32–36)
MCV RBC AUTO: 91 FL (ref 80–100)
NRBC BLD-RTO: 0.3 /100 WBCS (ref 0–0)
PLATELET # BLD AUTO: 190 X10*3/UL (ref 150–450)
POTASSIUM SERPL-SCNC: 4.6 MMOL/L (ref 3.5–5.3)
RBC # BLD AUTO: 4.09 X10*6/UL (ref 4.5–5.9)
SODIUM SERPL-SCNC: 138 MMOL/L (ref 136–145)
WBC # BLD AUTO: 7.5 X10*3/UL (ref 4.4–11.3)

## 2025-01-25 PROCEDURE — 82374 ASSAY BLOOD CARBON DIOXIDE: CPT | Mod: IPSPLIT | Performed by: NURSE PRACTITIONER

## 2025-01-25 PROCEDURE — 94640 AIRWAY INHALATION TREATMENT: CPT | Mod: IPSPLIT

## 2025-01-25 PROCEDURE — 2500000001 HC RX 250 WO HCPCS SELF ADMINISTERED DRUGS (ALT 637 FOR MEDICARE OP): Mod: IPSPLIT

## 2025-01-25 PROCEDURE — 2500000004 HC RX 250 GENERAL PHARMACY W/ HCPCS (ALT 636 FOR OP/ED): Mod: IPSPLIT | Performed by: NURSE PRACTITIONER

## 2025-01-25 PROCEDURE — 94760 N-INVAS EAR/PLS OXIMETRY 1: CPT | Mod: IPSPLIT

## 2025-01-25 PROCEDURE — 99239 HOSP IP/OBS DSCHRG MGMT >30: CPT | Performed by: NURSE PRACTITIONER

## 2025-01-25 PROCEDURE — 2500000001 HC RX 250 WO HCPCS SELF ADMINISTERED DRUGS (ALT 637 FOR MEDICARE OP): Mod: IPSPLIT | Performed by: NURSE PRACTITIONER

## 2025-01-25 PROCEDURE — 85027 COMPLETE CBC AUTOMATED: CPT | Mod: IPSPLIT | Performed by: NURSE PRACTITIONER

## 2025-01-25 PROCEDURE — 2500000004 HC RX 250 GENERAL PHARMACY W/ HCPCS (ALT 636 FOR OP/ED): Mod: JZ,IPSPLIT

## 2025-01-25 PROCEDURE — 36415 COLL VENOUS BLD VENIPUNCTURE: CPT | Mod: IPSPLIT | Performed by: NURSE PRACTITIONER

## 2025-01-25 PROCEDURE — 2500000002 HC RX 250 W HCPCS SELF ADMINISTERED DRUGS (ALT 637 FOR MEDICARE OP, ALT 636 FOR OP/ED): Mod: IPSPLIT

## 2025-01-25 RX ORDER — AZITHROMYCIN 250 MG/1
TABLET, FILM COATED ORAL
Qty: 6 TABLET | Refills: 0 | Status: SHIPPED | OUTPATIENT
Start: 2025-01-25 | End: 2025-01-30

## 2025-01-25 RX ORDER — CEFUROXIME AXETIL 500 MG/1
500 TABLET ORAL EVERY 24 HOURS
Qty: 3 TABLET | Refills: 0 | Status: SHIPPED | OUTPATIENT
Start: 2025-01-25 | End: 2025-01-28

## 2025-01-25 RX ORDER — ALBUTEROL SULFATE 90 UG/1
2 INHALANT RESPIRATORY (INHALATION) EVERY 6 HOURS PRN
Qty: 18 G | Refills: 11 | Status: SHIPPED | OUTPATIENT
Start: 2025-01-25

## 2025-01-25 RX ORDER — PREDNISONE 20 MG/1
40 TABLET ORAL DAILY
Qty: 10 TABLET | Refills: 0 | Status: SHIPPED | OUTPATIENT
Start: 2025-01-25 | End: 2025-01-30 | Stop reason: ALTCHOICE

## 2025-01-25 RX ORDER — PREDNISONE 20 MG/1
40 TABLET ORAL DAILY
Status: DISCONTINUED | OUTPATIENT
Start: 2025-01-25 | End: 2025-01-25 | Stop reason: HOSPADM

## 2025-01-25 RX ORDER — IPRATROPIUM BROMIDE AND ALBUTEROL SULFATE 2.5; .5 MG/3ML; MG/3ML
3 SOLUTION RESPIRATORY (INHALATION)
Qty: 360 ML | Refills: 11 | Status: SHIPPED | OUTPATIENT
Start: 2025-01-25 | End: 2025-01-25 | Stop reason: HOSPADM

## 2025-01-25 RX ORDER — ALBUTEROL SULFATE 90 UG/1
2 INHALANT RESPIRATORY (INHALATION) EVERY 6 HOURS PRN
Status: DISCONTINUED | OUTPATIENT
Start: 2025-01-25 | End: 2025-01-25 | Stop reason: HOSPADM

## 2025-01-25 RX ADMIN — TAMSULOSIN HYDROCHLORIDE 0.4 MG: 0.4 CAPSULE ORAL at 09:42

## 2025-01-25 RX ADMIN — CARVEDILOL 25 MG: 25 TABLET, FILM COATED ORAL at 09:42

## 2025-01-25 RX ADMIN — Medication 2000 UNITS: at 09:42

## 2025-01-25 RX ADMIN — LEVOTHYROXINE SODIUM 50 MCG: 0.05 TABLET ORAL at 09:42

## 2025-01-25 RX ADMIN — AZITHROMYCIN DIHYDRATE 500 MG: 250 TABLET, FILM COATED ORAL at 09:42

## 2025-01-25 RX ADMIN — CEFUROXIME AXETIL 500 MG: 250 TABLET, FILM COATED ORAL at 09:42

## 2025-01-25 RX ADMIN — PANTOPRAZOLE SODIUM 40 MG: 40 TABLET, DELAYED RELEASE ORAL at 06:35

## 2025-01-25 RX ADMIN — PREDNISONE 40 MG: 20 TABLET ORAL at 10:32

## 2025-01-25 RX ADMIN — CYANOCOBALAMIN TAB 1000 MCG 1000 MCG: 1000 TAB at 09:42

## 2025-01-25 RX ADMIN — ASPIRIN 81 MG: 81 TABLET, COATED ORAL at 09:42

## 2025-01-25 RX ADMIN — METHYLPREDNISOLONE SODIUM SUCCINATE 40 MG: 40 INJECTION, POWDER, FOR SOLUTION INTRAMUSCULAR; INTRAVENOUS at 03:25

## 2025-01-25 RX ADMIN — FINASTERIDE 5 MG: 5 TABLET, FILM COATED ORAL at 09:42

## 2025-01-25 RX ADMIN — IPRATROPIUM BROMIDE AND ALBUTEROL SULFATE 3 ML: .5; 3 SOLUTION RESPIRATORY (INHALATION) at 09:12

## 2025-01-25 ASSESSMENT — PAIN SCALES - GENERAL
PAINLEVEL_OUTOF10: 0 - NO PAIN
PAINLEVEL_OUTOF10: 0 - NO PAIN

## 2025-01-25 ASSESSMENT — PAIN - FUNCTIONAL ASSESSMENT: PAIN_FUNCTIONAL_ASSESSMENT: 0-10

## 2025-01-25 NOTE — NURSING NOTE
1115: given discharge instructions, educated, and answered all questions at this time. Call bell in reach, no other needs at this time    1255: pt IV removed, VSS. Pt being discharged at this time to home

## 2025-01-25 NOTE — CARE PLAN
The patient's goals for the shift include no SOb    The clinical goals for the shift include pt will tolerate RA and have an SpO2 >90%    Over the shift, the patient did make progress toward the following goals. Pt tolerated RA and SpO2 >90% No SOB. Discharge order in place      Problem: Pain - Adult  Goal: Verbalizes/displays adequate comfort level or baseline comfort level  Outcome: Met     Problem: Safety - Adult  Goal: Free from fall injury  Outcome: Met     Problem: Discharge Planning  Goal: Discharge to home or other facility with appropriate resources  Outcome: Met     Problem: Chronic Conditions and Co-morbidities  Goal: Patient's chronic conditions and co-morbidity symptoms are monitored and maintained or improved  Outcome: Progressing     Problem: Nutrition  Goal: Oral intake greater than 50%  Outcome: Met  Goal: Consume prescribed supplement  Outcome: Met  Goal: Adequate PO fluid intake  Outcome: Met  Goal: Lab values WNL  Outcome: Met  Goal: Maintain stable weight  Outcome: Met

## 2025-01-25 NOTE — NURSING NOTE
Assumed care of patient. Patient has no complaints. Call light is within reach. Precautions up secondary to flu. Nurse will continue to medicate and monitor per MD order.   2000 Pt refused scd

## 2025-01-25 NOTE — DISCHARGE SUMMARY
Discharge Diagnosis  Influenza A    Issues Requiring Follow-Up  Influenza A    Discharge Meds     Medication List      ASK your doctor about these medications     allopurinol 100 mg tablet; Commonly known as: Zyloprim; Take 1 tablet   (100 mg) by mouth once daily.   aspirin 81 mg EC tablet   carvedilol 25 mg tablet; Commonly known as: Coreg; Take 1 tablet (25 mg)   by mouth 2 times daily (morning and late afternoon).   cyanocobalamin 1,000 mcg tablet; Commonly known as: Vitamin B-12   finasteride 5 mg tablet; Commonly known as: Proscar; Take 1 tablet (5   mg) by mouth once daily. DO NOT CRUSH CHEW OR SPLIT   furosemide 20 mg tablet; Commonly known as: Lasix; Take 1 tablet (20 mg)   by mouth once daily.   levothyroxine 50 mcg tablet; Commonly known as: Synthroid, Levoxyl; TAKE   ONE TABLET BY MOUTH ONCE DAILY   lisinopril 10 mg tablet; Take 1 tablet (10 mg) by mouth once daily.   pravastatin 20 mg tablet; Commonly known as: Pravachol; Take 1 tablet   (20 mg) by mouth once daily.   tamsulosin 0.4 mg 24 hr capsule; Commonly known as: Flomax; TAKE ONE   CAPSULE BY MOUTH EVERY DAY 30 MINUTES AFTER THE SAME MEAL EACH DAY   Vitamin D3 25 MCG (1000 UT) tablet; Generic drug: cholecalciferol       Test Results Pending At Discharge  Pending Labs       Order Current Status    Blood Culture Preliminary result    Blood Culture Preliminary result    Blood Culture Preliminary result    Blood Culture Preliminary result            Hospital Course   IV azithro and ceftriaxone to cover CAP (procal 0.23)steroids and bronchodilators - pt weaned to room air with mild residual upper airway wheezing - tolerating ambulation in the chavez and states he is feeling much better. ARTHUR on CKD resolved. Home nebulizer ordered    Pertinent Physical Exam At Time of Discharge  Physical Exam  Constitutional:       Appearance: Normal appearance.   HENT:      Head: Normocephalic.      Nose: Nose normal.      Mouth/Throat:      Mouth: Mucous membranes are  moist.   Eyes:      Extraocular Movements: Extraocular movements intact.   Cardiovascular:      Rate and Rhythm: Normal rate and regular rhythm.      Pulses: Normal pulses.   Pulmonary:      Breath sounds: Wheezing present.      Comments: Upper airway wheezing  Abdominal:      General: Bowel sounds are normal.      Palpations: Abdomen is soft.   Musculoskeletal:         General: Normal range of motion.      Cervical back: Normal range of motion.   Skin:     General: Skin is warm and dry.      Capillary Refill: Capillary refill takes less than 2 seconds.   Neurological:      General: No focal deficit present.      Mental Status: He is alert and oriented to person, place, and time.   Psychiatric:         Behavior: Behavior normal.         Outpatient Follow-Up  Future Appointments   Date Time Provider Department Center   3/20/2025  8:00 AM Lizzie Quintero MD [id number] [facility]   4/15/2025  9:20 AM Too Augustine MD [id number] [facility]     #Influenza A  #Pneumonia   #Acute hypoxic respiratory failure   -SOB x4 days   -CXR: Right-sided airspace consolidations, as above. Clinical correlation  and continued follow-up until clearing is recommended.  -WBC 8.2  -D dimer 656   -Influenza A + 1/21  -Covid/RSV neg   -Blood cx 1/2 positive for gram + cocci, clusters - staph epidermidis - likely contaminant   -MRSA nares neg   -IV vanco (Day 1)  -Repeat blood cx pending   -Sputum cx if able   -Procal 0.23  -IV azithro, ceftriaxone (Day 4)  -Solumedrol q8  -Bronchodilators  -Supplemental oxygen to maintain an sp02 greater than 92%  -Currently on 2L > weaned to RA   -Baseline RA  -Isolation protocol     #Acute on chronic renal failure, stage III  -Baseline creat ~ 1.8 - 2.0 according to nephro   -Bun/creat 56/2.40 > 64/2.61 > 71/2.09 > 74/2.11  -Renally dose medications as able   -Hold nephrotoxic agents; allopurinol, furosemide, lisinopril, pravastatin  -Daily BMP      #Hyperkalemia, improved   -K 5.4 > 4.4 >4.5  -Lokelma 10gm  TID x1 day  -Daily BMP      #Hyperglycemia  -Glucose on admission 241  -No hx of DM  -No A1C on file  -Hgb A1C 6.1  -Follow up with PCP; instructed patient      #Essential HTN  #HLD   #Chronic leg swelling   -Continue ASA, carvedilol  -Monitor BP and HR      #BPH  -Continue finasteride, tamsulosin  -Bladder scan PRN     #Gout, not in acute flare   -Continue allopurinol; on hold currently      DVT ppx  -lovenox      PUD ppx  -pantoprazole     F: PRN  E: Replete per protocol  N: Regular  A: PIV    Concepción Pathak, APRN-CNP

## 2025-01-25 NOTE — CARE PLAN
The patient's goals for the shift include sleep    The clinical goals for the shift include Patient will maintain a safe enviornment    Patient had uneventful night. Patient maintained a safe environment. Patient had no complaints of pain or distress.

## 2025-01-26 LAB
BACTERIA BLD AEROBE CULT: ABNORMAL
BACTERIA BLD CULT: ABNORMAL
BACTERIA BLD CULT: NORMAL
BACTERIA BLD CULT: NORMAL
GRAM STN SPEC: ABNORMAL

## 2025-01-27 ENCOUNTER — TELEPHONE (OUTPATIENT)
Dept: PRIMARY CARE | Facility: CLINIC | Age: 85
End: 2025-01-27
Payer: MEDICARE

## 2025-01-27 LAB
BACTERIA BLD CULT: NORMAL
BACTERIA BLD CULT: NORMAL

## 2025-01-27 NOTE — TELEPHONE ENCOUNTER
Transition of Care    Inpatient facility: [facility]  Discharge diagnosis: Influenza A  Discharged to: Home  Discharge date: 1/25/25  Initial Call date: 1/27/25  Spoke with patient/caregiver: Patient                                                                     Do you need assistance  visits prior to your PCP visit: No  Home health care ordered: No  Have you been contacted by home care and have a start of care date: No  Are you taking medications as prescribed at discharge: Yes    Referral to [facility] Pharmacist: No  Patient advised to bring all medications to PCP follow-up appointment.  Patient advised to follow discharge instructions until provider follow-up.  TCM visit date: 1/30/25  TCM provider visit with: NADIA Quintero MD

## 2025-01-28 ENCOUNTER — PATIENT OUTREACH (OUTPATIENT)
Dept: CARE COORDINATION | Facility: CLINIC | Age: 85
End: 2025-01-28
Payer: MEDICARE

## 2025-01-28 SDOH — ECONOMIC STABILITY: GENERAL: WOULD YOU LIKE HELP WITH ANY OF THE FOLLOWING NEEDS?: I DONT NEED HELP WITH ANY OF THESE

## 2025-01-28 NOTE — PROGRESS NOTES
Outreach call to patient to support a smooth transition of care from recent admission.  Spoke with patient, reviewed discharge medications, discharge instructions, assessed social needs, and reviewed upcoming appointment date with PCP. Will continue to monitor through transition period.

## 2025-01-30 ENCOUNTER — OFFICE VISIT (OUTPATIENT)
Dept: PRIMARY CARE | Facility: CLINIC | Age: 85
End: 2025-01-30
Payer: MEDICARE

## 2025-01-30 VITALS
HEART RATE: 81 BPM | BODY MASS INDEX: 31.07 KG/M2 | SYSTOLIC BLOOD PRESSURE: 116 MMHG | OXYGEN SATURATION: 96 % | WEIGHT: 210.4 LBS | DIASTOLIC BLOOD PRESSURE: 60 MMHG | TEMPERATURE: 97.7 F

## 2025-01-30 DIAGNOSIS — I10 HYPERTENSION, UNSPECIFIED TYPE: ICD-10-CM

## 2025-01-30 DIAGNOSIS — J10.1 INFLUENZA A: Primary | ICD-10-CM

## 2025-01-30 DIAGNOSIS — N18.32 STAGE 3B CHRONIC KIDNEY DISEASE (MULTI): ICD-10-CM

## 2025-01-30 DIAGNOSIS — I95.9 HYPOTENSION, UNSPECIFIED HYPOTENSION TYPE: ICD-10-CM

## 2025-01-30 DIAGNOSIS — I10 HYPERTENSION, UNCONTROLLED: ICD-10-CM

## 2025-01-30 DIAGNOSIS — M1A.9XX0 CHRONIC GOUT WITHOUT TOPHUS, UNSPECIFIED CAUSE, UNSPECIFIED SITE: ICD-10-CM

## 2025-01-30 DIAGNOSIS — J18.9 PNEUMONIA DUE TO INFECTIOUS ORGANISM, UNSPECIFIED LATERALITY, UNSPECIFIED PART OF LUNG: ICD-10-CM

## 2025-01-30 PROCEDURE — 3078F DIAST BP <80 MM HG: CPT | Performed by: INTERNAL MEDICINE

## 2025-01-30 PROCEDURE — 1111F DSCHRG MED/CURRENT MED MERGE: CPT | Performed by: INTERNAL MEDICINE

## 2025-01-30 PROCEDURE — 1160F RVW MEDS BY RX/DR IN RCRD: CPT | Performed by: INTERNAL MEDICINE

## 2025-01-30 PROCEDURE — 3074F SYST BP LT 130 MM HG: CPT | Performed by: INTERNAL MEDICINE

## 2025-01-30 PROCEDURE — 1036F TOBACCO NON-USER: CPT | Performed by: INTERNAL MEDICINE

## 2025-01-30 PROCEDURE — 1159F MED LIST DOCD IN RCRD: CPT | Performed by: INTERNAL MEDICINE

## 2025-01-30 PROCEDURE — 99496 TRANSJ CARE MGMT HIGH F2F 7D: CPT | Performed by: INTERNAL MEDICINE

## 2025-01-30 RX ORDER — LISINOPRIL 5 MG/1
10 TABLET ORAL DAILY
Qty: 60 TABLET | Refills: 11 | Status: SHIPPED | OUTPATIENT
Start: 2025-01-30 | End: 2026-01-30

## 2025-01-30 RX ORDER — CARVEDILOL 25 MG/1
12.5 TABLET ORAL
Qty: 180 TABLET | Refills: 1 | Status: SHIPPED | OUTPATIENT
Start: 2025-01-30

## 2025-01-30 ASSESSMENT — ENCOUNTER SYMPTOMS
PALPITATIONS: 0
DIZZINESS: 0
SORE THROAT: 0
FEVER: 0
SINUS PAIN: 0
FATIGUE: 1
COUGH: 0
WHEEZING: 0
DIFFICULTY URINATING: 0
BLOOD IN STOOL: 0
BRUISES/BLEEDS EASILY: 0
ARTHRALGIAS: 0
HEADACHES: 0
ABDOMINAL PAIN: 0
DIARRHEA: 0
UNEXPECTED WEIGHT CHANGE: 0

## 2025-01-30 NOTE — PROGRESS NOTES
Subjective   Patient ID: Cesar Wood is a 84 y.o. male who presents for Hospital Follow-up (TCM, hospital follow up for influenza A, discharged on 1/25/25).    Transition of care  Patient admission history and physical, hospital course, medications, verified and reviewed  Patient contacted after discharge, medications verified comes today for follow-up    Inpatient facility: [facility]  Discharge diagnosis: Influenza A  Discharged to: Home  Discharge date: 1/25/25  Initial Call date: 1/27/25  Spoke with patient/caregiver: Patient                                                                      Do you need assistance  visits prior to your PCP visit: No  Home health care ordered: No  Have you been contacted by home care and have a start of care date: No  Are you taking medications as prescribed at discharge: Yes     Referral to [facility] Pharmacist: No  Patient advised to bring all medications to PCP follow-up appointment.  Patient advised to follow discharge instructions until provider follow-up.  TCM visit date: 1/30/25  TCM provider visit with: NADIA Quintero MD  Patient admitted for acute on chronic respiratory failure acute renal failure on chronic renal failure, diagnosed with influenza type B and pneumonia patient treated with IV fluids IV antibiotics  Improved discharged home comes today for evaluation  Commingled fatigue and tiredness  -Fatigue in past due to low blood pressure and deconditioning  Need to cut down lisinopril to take only 5 mg daily  Cut down on carvedilol to take only 12.5 mg twice a day  - Acute on top of chronic insufficiency improved follow-up BMP in 4 weeks  - Hypoxemia improved  - Deconditioning increase activity multivitamins  --Chronic kidney disease and hyperkalemia patient seen by nephrology l follow-up BMP closely  - Chronic gout controlled continues allopurinol no recurrence  - BPH continue with tamsulosin symptoms are controlled  - Chronic leg  swelling improved continue with Lasix 20 mg daily continue low-salt diet continue with current medication.  -- Hypothyroid controlled continue levothyroxine follow-up thyroid function test in 6 months  Follow-up 4 weeks           Review of Systems   Constitutional:  Positive for fatigue. Negative for fever and unexpected weight change.   HENT:  Negative for congestion, ear discharge, ear pain, mouth sores, sinus pain and sore throat.    Eyes:  Negative for visual disturbance.   Respiratory:  Negative for cough and wheezing.    Cardiovascular:  Negative for chest pain, palpitations and leg swelling.   Gastrointestinal:  Negative for abdominal pain, blood in stool and diarrhea.   Genitourinary:  Negative for difficulty urinating.   Musculoskeletal:  Negative for arthralgias.   Skin:  Negative for rash.   Neurological:  Negative for dizziness and headaches.   Hematological:  Does not bruise/bleed easily.   Psychiatric/Behavioral:  Negative for behavioral problems.    All other systems reviewed and are negative.      Objective   Lab Results   Component Value Date    HGBA1C 6.1 (H) 01/21/2025      /60   Pulse 81   Temp 36.5 °C (97.7 °F)   Wt 95.4 kg (210 lb 6.4 oz)   SpO2 96%   BMI 31.07 kg/m²     Physical Exam  Vitals and nursing note reviewed.   Constitutional:       Appearance: Normal appearance.   HENT:      Head: Normocephalic.      Nose: Nose normal.   Eyes:      Conjunctiva/sclera: Conjunctivae normal.      Pupils: Pupils are equal, round, and reactive to light.   Cardiovascular:      Rate and Rhythm: Regular rhythm.   Pulmonary:      Effort: Pulmonary effort is normal.      Breath sounds: Normal breath sounds.   Abdominal:      General: Abdomen is flat.      Palpations: Abdomen is soft.   Musculoskeletal:      Cervical back: Neck supple.   Skin:     General: Skin is warm.   Neurological:      General: No focal deficit present.      Mental Status: He is oriented to person, place, and time.    Psychiatric:         Mood and Affect: Mood normal.         Assessment/Plan   Cesar was seen today for hospital follow-up.  Diagnoses and all orders for this visit:  Influenza A (Primary)  Hypertension, uncontrolled  Stage 3b chronic kidney disease (Multi)  Pneumonia due to infectious organism, unspecified laterality, unspecified part of lung  Hypertension, unspecified type  Chronic gout without tophus, unspecified cause, unspecified site  Hypotension, unspecified hypotension type   Transition of care  Patient admission history and physical, hospital course, medications, verified and reviewed  Patient contacted after discharge, medications verified comes today for follow-up    Inpatient facility: [facility]  Discharge diagnosis: Influenza A  Discharged to: Home  Discharge date: 1/25/25  Initial Call date: 1/27/25  Spoke with patient/caregiver: Patient                                                                      Do you need assistance  visits prior to your PCP visit: No  Home health care ordered: No  Have you been contacted by home care and have a start of care date: No  Are you taking medications as prescribed at discharge: Yes     Referral to [facility] Pharmacist: No  Patient advised to bring all medications to PCP follow-up appointment.  Patient advised to follow discharge instructions until provider follow-up.  TCM visit date: 1/30/25  TCM provider visit with: NADIA Quintero MD  Patient admitted for acute on chronic respiratory failure acute renal failure on chronic renal failure, diagnosed with influenza type B and pneumonia patient treated with IV fluids IV antibiotics  Improved discharged home comes today for evaluation  Commingled fatigue and tiredness  -Fatigue in past due to low blood pressure and deconditioning  Need to cut down lisinopril to take only 5 mg daily  Cut down on carvedilol to take only 12.5 mg twice a day  - Acute on top of chronic insufficiency improved  follow-up BMP in 4 weeks  - Hypoxemia improved  - Deconditioning increase activity multivitamins  --Chronic kidney disease and hyperkalemia patient seen by nephrology l follow-up BMP closely  - Chronic gout controlled continues allopurinol no recurrence  - BPH continue with tamsulosin symptoms are controlled  - Chronic leg swelling improved continue with Lasix 20 mg daily continue low-salt diet continue with current medication.  -- Hypothyroid controlled continue levothyroxine follow-up thyroid function test in 6 months  Follow-up 4 weeks

## 2025-02-11 LAB
ATRIAL RATE: 89 BPM
P AXIS: 42 DEGREES
P OFFSET: 190 MS
P ONSET: 132 MS
PR INTERVAL: 170 MS
Q ONSET: 217 MS
QRS COUNT: 15 BEATS
QRS DURATION: 74 MS
QT INTERVAL: 336 MS
QTC CALCULATION(BAZETT): 408 MS
QTC FREDERICIA: 383 MS
R AXIS: 78 DEGREES
T AXIS: 59 DEGREES
T OFFSET: 385 MS
VENTRICULAR RATE: 89 BPM

## 2025-02-14 ENCOUNTER — PATIENT OUTREACH (OUTPATIENT)
Dept: CARE COORDINATION | Facility: CLINIC | Age: 85
End: 2025-02-14
Payer: MEDICARE

## 2025-02-15 DIAGNOSIS — E03.9 HYPOTHYROIDISM, UNSPECIFIED TYPE: ICD-10-CM

## 2025-02-17 RX ORDER — LEVOTHYROXINE SODIUM 50 UG/1
50 TABLET ORAL DAILY
Qty: 90 TABLET | Refills: 0 | Status: SHIPPED | OUTPATIENT
Start: 2025-02-17

## 2025-02-25 LAB
ANION GAP SERPL CALCULATED.4IONS-SCNC: 10 MMOL/L (CALC) (ref 7–17)
BUN SERPL-MCNC: 40 MG/DL (ref 7–25)
BUN/CREAT SERPL: 21 (CALC) (ref 6–22)
CALCIUM SERPL-MCNC: 9.2 MG/DL (ref 8.6–10.3)
CHLORIDE SERPL-SCNC: 103 MMOL/L (ref 98–110)
CO2 SERPL-SCNC: 27 MMOL/L (ref 20–32)
CREAT SERPL-MCNC: 1.93 MG/DL (ref 0.7–1.22)
EGFRCR SERPLBLD CKD-EPI 2021: 34 ML/MIN/1.73M2
GLUCOSE SERPL-MCNC: 161 MG/DL (ref 65–139)
POTASSIUM SERPL-SCNC: 4.6 MMOL/L (ref 3.5–5.3)
SODIUM SERPL-SCNC: 140 MMOL/L (ref 135–146)

## 2025-03-03 ENCOUNTER — APPOINTMENT (OUTPATIENT)
Dept: PRIMARY CARE | Facility: CLINIC | Age: 85
End: 2025-03-03
Payer: MEDICARE

## 2025-03-03 VITALS
BODY MASS INDEX: 31.92 KG/M2 | SYSTOLIC BLOOD PRESSURE: 144 MMHG | OXYGEN SATURATION: 98 % | DIASTOLIC BLOOD PRESSURE: 74 MMHG | TEMPERATURE: 97.2 F | HEIGHT: 68 IN | WEIGHT: 210.6 LBS | HEART RATE: 88 BPM

## 2025-03-03 DIAGNOSIS — N18.4 STAGE 4 CHRONIC KIDNEY DISEASE (MULTI): ICD-10-CM

## 2025-03-03 DIAGNOSIS — E78.00 HYPERCHOLESTEROLEMIA: ICD-10-CM

## 2025-03-03 DIAGNOSIS — Z00.00 ROUTINE GENERAL MEDICAL EXAMINATION AT HEALTH CARE FACILITY: Primary | ICD-10-CM

## 2025-03-03 DIAGNOSIS — I10 HYPERTENSION, UNSPECIFIED TYPE: ICD-10-CM

## 2025-03-03 DIAGNOSIS — M1A.9XX0 CHRONIC GOUT WITHOUT TOPHUS, UNSPECIFIED CAUSE, UNSPECIFIED SITE: ICD-10-CM

## 2025-03-03 DIAGNOSIS — E03.9 ACQUIRED HYPOTHYROIDISM: ICD-10-CM

## 2025-03-03 DIAGNOSIS — E78.00 HYPERCHOLESTEREMIA: ICD-10-CM

## 2025-03-03 DIAGNOSIS — R60.9 EDEMA, UNSPECIFIED TYPE: ICD-10-CM

## 2025-03-03 DIAGNOSIS — I10 HYPERTENSION, UNCONTROLLED: ICD-10-CM

## 2025-03-03 DIAGNOSIS — I95.9 HYPOTENSION, UNSPECIFIED HYPOTENSION TYPE: ICD-10-CM

## 2025-03-03 PROCEDURE — 99214 OFFICE O/P EST MOD 30 MIN: CPT | Performed by: INTERNAL MEDICINE

## 2025-03-03 PROCEDURE — 1160F RVW MEDS BY RX/DR IN RCRD: CPT | Performed by: INTERNAL MEDICINE

## 2025-03-03 PROCEDURE — 3078F DIAST BP <80 MM HG: CPT | Performed by: INTERNAL MEDICINE

## 2025-03-03 PROCEDURE — 1124F ACP DISCUSS-NO DSCNMKR DOCD: CPT | Performed by: INTERNAL MEDICINE

## 2025-03-03 PROCEDURE — 1036F TOBACCO NON-USER: CPT | Performed by: INTERNAL MEDICINE

## 2025-03-03 PROCEDURE — 3077F SYST BP >= 140 MM HG: CPT | Performed by: INTERNAL MEDICINE

## 2025-03-03 PROCEDURE — G0439 PPPS, SUBSEQ VISIT: HCPCS | Performed by: INTERNAL MEDICINE

## 2025-03-03 PROCEDURE — 1170F FXNL STATUS ASSESSED: CPT | Performed by: INTERNAL MEDICINE

## 2025-03-03 PROCEDURE — 1159F MED LIST DOCD IN RCRD: CPT | Performed by: INTERNAL MEDICINE

## 2025-03-03 RX ORDER — TAMSULOSIN HYDROCHLORIDE 0.4 MG/1
CAPSULE ORAL
Qty: 90 CAPSULE | Refills: 1 | Status: SHIPPED | OUTPATIENT
Start: 2025-03-03

## 2025-03-03 RX ORDER — FUROSEMIDE 20 MG/1
20 TABLET ORAL DAILY
Qty: 90 TABLET | Refills: 1 | Status: SHIPPED | OUTPATIENT
Start: 2025-03-03

## 2025-03-03 RX ORDER — ALLOPURINOL 100 MG/1
100 TABLET ORAL DAILY
Qty: 90 TABLET | Refills: 1 | Status: SHIPPED | OUTPATIENT
Start: 2025-03-03

## 2025-03-03 RX ORDER — ALLOPURINOL 100 MG/1
100 TABLET ORAL DAILY
Qty: 90 TABLET | Refills: 1 | Status: SHIPPED | OUTPATIENT
Start: 2025-03-03 | End: 2025-03-03 | Stop reason: SDUPTHER

## 2025-03-03 RX ORDER — FINASTERIDE 5 MG/1
5 TABLET, FILM COATED ORAL DAILY
Qty: 90 TABLET | Refills: 1 | Status: SHIPPED | OUTPATIENT
Start: 2025-03-03

## 2025-03-03 RX ORDER — PRAVASTATIN SODIUM 20 MG/1
20 TABLET ORAL DAILY
Qty: 90 TABLET | Refills: 1 | Status: SHIPPED | OUTPATIENT
Start: 2025-03-03

## 2025-03-03 RX ORDER — CARVEDILOL 25 MG/1
12.5 TABLET ORAL
Qty: 180 TABLET | Refills: 1 | Status: SHIPPED | OUTPATIENT
Start: 2025-03-03

## 2025-03-03 ASSESSMENT — ENCOUNTER SYMPTOMS
SINUS PAIN: 0
SORE THROAT: 0
WHEEZING: 0
BRUISES/BLEEDS EASILY: 0
ARTHRALGIAS: 0
FEVER: 0
UNEXPECTED WEIGHT CHANGE: 0
HEADACHES: 0
COUGH: 0
BLOOD IN STOOL: 0
FATIGUE: 0
DIARRHEA: 0
PALPITATIONS: 0
ABDOMINAL PAIN: 0
DIFFICULTY URINATING: 0
DIZZINESS: 0

## 2025-03-03 ASSESSMENT — ACTIVITIES OF DAILY LIVING (ADL)
TAKING_MEDICATION: INDEPENDENT
BATHING: INDEPENDENT
GROCERY_SHOPPING: INDEPENDENT
MANAGING_FINANCES: INDEPENDENT
DRESSING: INDEPENDENT
DOING_HOUSEWORK: INDEPENDENT

## 2025-03-03 ASSESSMENT — PATIENT HEALTH QUESTIONNAIRE - PHQ9
SUM OF ALL RESPONSES TO PHQ9 QUESTIONS 1 AND 2: 0
2. FEELING DOWN, DEPRESSED OR HOPELESS: NOT AT ALL
1. LITTLE INTEREST OR PLEASURE IN DOING THINGS: NOT AT ALL

## 2025-03-03 NOTE — ASSESSMENT & PLAN NOTE
Orders:    tamsulosin (Flomax) 0.4 mg 24 hr capsule; TAKE ONE CAPSULE BY MOUTH EVERY DAY 30 MINUTES AFTER THE SAME MEAL EACH DAY

## 2025-03-03 NOTE — ASSESSMENT & PLAN NOTE
Orders:    finasteride (Proscar) 5 mg tablet; Take 1 tablet (5 mg) by mouth once daily. DO NOT CRUSH CHEW OR SPLIT    carvedilol (Coreg) 25 mg tablet; Take 0.5 tablets (12.5 mg) by mouth 2 times daily (morning and late afternoon).

## 2025-03-03 NOTE — PROGRESS NOTES
Subjective   Reason for Visit: Cesar Wood is an 84 y.o. male here for a Medicare Wellness visit.     Past Medical, Surgical, and Family History reviewed and updated in chart.    Reviewed all medications by prescribing practitioner or clinical pharmacist (such as prescriptions, OTCs, herbal therapies and supplements) and documented in the medical record.    Annual preventive visit and Medicare physical  -Vaccination reviewed and up-to-date  - Depression negative  -Screen for colon cancer done no need to repeat  - Advance of directive reviewed  - Recent blood work is up-to-date    Follow-up  --Recent acute on chronic respiratory failure doing well secondary to influenza type B continue monitoring counts about flu vaccine  -Renal insufficiency stable continue lisinopril 12.5 mg twice a day doing well  -Chronic renal sufficiency improving,  - Hypoxemia improved  - Deconditioning increase activity multivitamins  --Chronic kidney disease and hyperkalemia patient seen by nephrology l follow-up BMP closely  - Chronic gout controlled continues allopurinol no recurrence  - BPH continue with tamsulosin symptoms are controlled  - Chronic leg swelling improved continue with Lasix 20 mg daily continue low-salt diet continue with current medication.  -- Hypothyroid controlled continue levothyroxine follow-up thyroid function test in 6 months  Follow-up 3 months          Patient Care Team:  Lizzie Quintero MD as PCP - General (Internal Medicine)  Lizzie Quintero MD as PCP - [facility] ACO Attributed Provider  MD Cinthya Sherman, RN as Care Manager (Case Management)     Review of Systems   Constitutional:  Negative for fatigue, fever and unexpected weight change.   HENT:  Negative for congestion, ear discharge, ear pain, mouth sores, sinus pain and sore throat.    Eyes:  Negative for visual disturbance.   Respiratory:  Negative for cough and wheezing.    Cardiovascular:  Negative for chest pain, palpitations  "and leg swelling.   Gastrointestinal:  Negative for abdominal pain, blood in stool and diarrhea.   Genitourinary:  Negative for difficulty urinating.   Musculoskeletal:  Negative for arthralgias.   Skin:  Negative for rash.   Neurological:  Negative for dizziness and headaches.   Hematological:  Does not bruise/bleed easily.   Psychiatric/Behavioral:  Negative for behavioral problems.    All other systems reviewed and are negative.      Objective   Vitals:  /74   Pulse 88   Temp 36.2 °C (97.2 °F)   Ht 1.727 m (5' 8\")   Wt 95.5 kg (210 lb 9.6 oz)   SpO2 98%   BMI 32.02 kg/m²       Physical Exam  Vitals and nursing note reviewed.   Constitutional:       Appearance: Normal appearance.   HENT:      Head: Normocephalic.      Nose: Nose normal.   Eyes:      Conjunctiva/sclera: Conjunctivae normal.      Pupils: Pupils are equal, round, and reactive to light.   Cardiovascular:      Rate and Rhythm: Regular rhythm.   Pulmonary:      Effort: Pulmonary effort is normal.      Breath sounds: Normal breath sounds.   Abdominal:      General: Abdomen is flat.      Palpations: Abdomen is soft.   Musculoskeletal:      Cervical back: Neck supple.   Skin:     General: Skin is warm.   Neurological:      General: No focal deficit present.      Mental Status: He is oriented to person, place, and time.   Psychiatric:         Mood and Affect: Mood normal.         Assessment & Plan  Chronic gout without tophus, unspecified cause, unspecified site    Orders:    allopurinol (Zyloprim) 100 mg tablet; Take 1 tablet (100 mg) by mouth once daily.    Hypertension, uncontrolled    Orders:    finasteride (Proscar) 5 mg tablet; Take 1 tablet (5 mg) by mouth once daily. DO NOT CRUSH CHEW OR SPLIT    carvedilol (Coreg) 25 mg tablet; Take 0.5 tablets (12.5 mg) by mouth 2 times daily (morning and late afternoon).    Edema, unspecified type    Orders:    furosemide (Lasix) 20 mg tablet; Take 1 tablet (20 mg) by mouth once " daily.    Hypercholesterolemia    Orders:    pravastatin (Pravachol) 20 mg tablet; Take 1 tablet (20 mg) by mouth once daily.    Stage 4 chronic kidney disease (Multi)    Orders:    tamsulosin (Flomax) 0.4 mg 24 hr capsule; TAKE ONE CAPSULE BY MOUTH EVERY DAY 30 MINUTES AFTER THE SAME MEAL EACH DAY    Routine general medical examination at health care facility    Orders:    1 Year Follow Up In Primary Care - Wellness Exam; Future    Hypertension, unspecified type         Hypotension, unspecified hypotension type         Hypercholesteremia         Acquired hypothyroidism          Annual preventive visit and Medicare physical  -Vaccination reviewed and up-to-date  - Depression negative  -Screen for colon cancer done no need to repeat  - Advance of directive reviewed  - Recent blood work is up-to-date    Follow-up  --Recent acute on chronic respiratory failure doing well secondary to influenza type B continue monitoring counts about flu vaccine  -Renal insufficiency stable continue lisinopril 12.5 mg twice a day doing well  -Chronic renal sufficiency improving,  - Hypoxemia improved  - Deconditioning increase activity multivitamins  --Chronic kidney disease and hyperkalemia patient seen by nephrology l follow-up BMP closely  - Chronic gout controlled continues allopurinol no recurrence  - BPH continue with tamsulosin symptoms are controlled  - Chronic leg swelling improved continue with Lasix 20 mg daily continue low-salt diet continue with current medication.  -- Hypothyroid controlled continue levothyroxine follow-up thyroid function test in 6 months  Follow-up 3 months         Patient was identified as a fall risk. Risk prevention instructions provided.

## 2025-03-03 NOTE — PROGRESS NOTES
"Subjective   Patient ID: Cesar Wood is a 84 y.o. male who presents for Medicare Annual Wellness Visit Subsequent and Results (BW).    HPI       Review of Systems    Objective   Lab Results   Component Value Date    HGBA1C 6.1 (H) 01/21/2025      /74   Pulse 88   Temp 36.2 °C (97.2 °F)   Ht 1.727 m (5' 8\")   Wt 95.5 kg (210 lb 9.6 oz)   SpO2 98%   BMI 32.02 kg/m²     Physical Exam    Assessment/Plan   Cesar was seen today for medicare annual wellness visit subsequent and results.  Diagnoses and all orders for this visit:  Chronic gout without tophus, unspecified cause, unspecified site  -     allopurinol (Zyloprim) 100 mg tablet; Take 1 tablet (100 mg) by mouth once daily.  Hypertension, uncontrolled  -     finasteride (Proscar) 5 mg tablet; Take 1 tablet (5 mg) by mouth once daily. DO NOT CRUSH CHEW OR SPLIT  Edema, unspecified type  -     furosemide (Lasix) 20 mg tablet; Take 1 tablet (20 mg) by mouth once daily.  Hypercholesterolemia  -     pravastatin (Pravachol) 20 mg tablet; Take 1 tablet (20 mg) by mouth once daily.  Stage 4 chronic kidney disease (Multi)  -     tamsulosin (Flomax) 0.4 mg 24 hr capsule; TAKE ONE CAPSULE BY MOUTH EVERY DAY 30 MINUTES AFTER THE SAME MEAL EACH DAY     " HR=44 bpm, CJNR=604/83 mmhg, SpO2=96.0 %, Resp=22 B/min, EtCO2=41 mmHg, Apnea=0 Seconds, Pain=0, Daniella=4, Lisa=5, Comment=intubated and sedated

## 2025-03-13 ENCOUNTER — PATIENT OUTREACH (OUTPATIENT)
Dept: CARE COORDINATION | Facility: CLINIC | Age: 85
End: 2025-03-13
Payer: MEDICARE

## 2025-03-20 ENCOUNTER — APPOINTMENT (OUTPATIENT)
Dept: PRIMARY CARE | Facility: CLINIC | Age: 85
End: 2025-03-20
Payer: MEDICARE

## 2025-04-11 LAB
25(OH)D3+25(OH)D2 SERPL-MCNC: 49 NG/ML (ref 30–100)
ALBUMIN SERPL-MCNC: 3.8 G/DL (ref 3.6–5.1)
ALBUMIN/CREAT UR: 19 MG/G CREAT
BUN SERPL-MCNC: 35 MG/DL (ref 7–25)
BUN/CREAT SERPL: 20 (CALC) (ref 6–22)
CALCIUM SERPL-MCNC: 9.1 MG/DL (ref 8.6–10.3)
CHLORIDE SERPL-SCNC: 105 MMOL/L (ref 98–110)
CO2 SERPL-SCNC: 25 MMOL/L (ref 20–32)
CREAT SERPL-MCNC: 1.71 MG/DL (ref 0.7–1.22)
CREAT UR-MCNC: 112 MG/DL (ref 20–320)
EGFRCR SERPLBLD CKD-EPI 2021: 39 ML/MIN/1.73M2
ERYTHROCYTE [DISTWIDTH] IN BLOOD BY AUTOMATED COUNT: 13.6 % (ref 11–15)
FERRITIN SERPL-MCNC: 546 NG/ML (ref 24–380)
GLUCOSE SERPL-MCNC: 123 MG/DL (ref 65–139)
HCT VFR BLD AUTO: 40.9 % (ref 38.5–50)
HGB BLD-MCNC: 13.2 G/DL (ref 13.2–17.1)
IRON SATN MFR SERPL: 24 % (CALC) (ref 20–48)
IRON SERPL-MCNC: 57 MCG/DL (ref 50–180)
MCH RBC QN AUTO: 29.7 PG (ref 27–33)
MCHC RBC AUTO-ENTMCNC: 32.3 G/DL (ref 32–36)
MCV RBC AUTO: 92.1 FL (ref 80–100)
MICROALBUMIN UR-MCNC: 2.1 MG/DL
PHOSPHATE SERPL-MCNC: 2.8 MG/DL (ref 2.1–4.3)
PLATELET # BLD AUTO: 190 THOUSAND/UL (ref 140–400)
PMV BLD REES-ECKER: 10.6 FL (ref 7.5–12.5)
POTASSIUM SERPL-SCNC: 4.3 MMOL/L (ref 3.5–5.3)
PTH-INTACT SERPL-MCNC: 90 PG/ML (ref 16–77)
RBC # BLD AUTO: 4.44 MILLION/UL (ref 4.2–5.8)
SODIUM SERPL-SCNC: 141 MMOL/L (ref 135–146)
TIBC SERPL-MCNC: 234 MCG/DL (CALC) (ref 250–425)
URATE SERPL-MCNC: 8.2 MG/DL (ref 4–8)
WBC # BLD AUTO: 7.1 THOUSAND/UL (ref 3.8–10.8)

## 2025-04-15 ENCOUNTER — APPOINTMENT (OUTPATIENT)
Dept: NEPHROLOGY | Facility: CLINIC | Age: 85
End: 2025-04-15
Payer: MEDICARE

## 2025-04-15 VITALS
DIASTOLIC BLOOD PRESSURE: 80 MMHG | BODY MASS INDEX: 31.1 KG/M2 | HEIGHT: 69 IN | WEIGHT: 210 LBS | SYSTOLIC BLOOD PRESSURE: 142 MMHG

## 2025-04-15 DIAGNOSIS — N40.0 ENLARGED PROSTATE WITHOUT LOWER URINARY TRACT SYMPTOMS (LUTS): ICD-10-CM

## 2025-04-15 DIAGNOSIS — N18.32 STAGE 3B CHRONIC KIDNEY DISEASE (MULTI): ICD-10-CM

## 2025-04-15 DIAGNOSIS — I10 HYPERTENSION, UNCONTROLLED: ICD-10-CM

## 2025-04-15 DIAGNOSIS — N18.32 STAGE 3B CHRONIC KIDNEY DISEASE (MULTI): Primary | ICD-10-CM

## 2025-04-15 DIAGNOSIS — M1A.9XX0 CHRONIC GOUT WITHOUT TOPHUS, UNSPECIFIED CAUSE, UNSPECIFIED SITE: ICD-10-CM

## 2025-04-15 PROCEDURE — G2211 COMPLEX E/M VISIT ADD ON: HCPCS | Performed by: INTERNAL MEDICINE

## 2025-04-15 PROCEDURE — 1159F MED LIST DOCD IN RCRD: CPT | Performed by: INTERNAL MEDICINE

## 2025-04-15 PROCEDURE — 3079F DIAST BP 80-89 MM HG: CPT | Performed by: INTERNAL MEDICINE

## 2025-04-15 PROCEDURE — 99214 OFFICE O/P EST MOD 30 MIN: CPT | Performed by: INTERNAL MEDICINE

## 2025-04-15 PROCEDURE — 3077F SYST BP >= 140 MM HG: CPT | Performed by: INTERNAL MEDICINE

## 2025-04-15 RX ORDER — ALLOPURINOL 100 MG/1
100 TABLET ORAL 2 TIMES DAILY
Qty: 180 TABLET | Refills: 3 | Status: SHIPPED | OUTPATIENT
Start: 2025-04-15 | End: 2026-04-15

## 2025-04-15 RX ORDER — LISINOPRIL 5 MG/1
5 TABLET ORAL DAILY
Qty: 30 TABLET | Refills: 11 | Status: SHIPPED | OUTPATIENT
Start: 2025-04-15 | End: 2026-04-15

## 2025-04-15 RX ORDER — ALLOPURINOL 100 MG/1
100 TABLET ORAL 2 TIMES DAILY
Qty: 90 TABLET | Refills: 1 | Status: SHIPPED | OUTPATIENT
Start: 2025-04-15 | End: 2025-04-15 | Stop reason: SDUPTHER

## 2025-04-15 NOTE — PROGRESS NOTES
Subjective       Cesar Wood is a 85 y.o. male who has past medical history of BPH, gout, hypertension, hypothyroidism, and CKD presented to the clinic via virtual visit.  Virtual visit today.  Doing okay.  No kidney related complaints or concerns.  Currently takes lisinopril 5 mg-blood pressure is accepted, potassium is normal.  Uric acid remains to be elevated 8.2-no recent gout flare.  He continues to be on allopurinol 100 mg.  Recent blood work showed improved serum creatinine 1.7 from prior 2, GFR improved 39 from prior 32%.  Cesar is pleased with improvement in kidney function.  Today we discussed increasing allopurinol 200 mg twice daily and monitor uric acid.  Continue blood pressure medications as it is.  Follow-up in 6 months      Prior notes  Patient feels well overall. Last visit in May. He denies any acute distress. BP is acceptable today. We lowered his lisinopril from 40 to 10 mg last visit which appear to be holding his pressure. Kidney function is stable and GFR improved from 21 to 32 and Cr 2.85 to 1.99. his hyperkalemia have resolved previously and been off veltassa. No protein leak. Otherwise he is doing well. No fever, chills, CP, SOB, N/V, abd pain, urinary symptoms, dysuria, or hematuria.       Per Prior Note     Cesar has a virtual visit today over the phone. He is aware that his kidney function has decreased. He has no specific kidney concerns or symptoms. We discussed how much fluid he should be drinking per day and he admits he does not drink enough but will increase it. We recommend he drinks 50 oz of fluid per day. He denies difficulties urinating, but states that sometimes he will urinate and have to urinate again right after. Discussed that we will plan to decrease his Lisinopril from 40 mg to 10 mg. Discussed that he may see his blood pressure increase slightly with SBPs to 130-140s which are okay. If SBP is consistently above 150, he should call the office.    Per prior  note    Last office visit September 2023.  In the interim, no recent gout flares.  No hospital admissions.  Cesar came alone today.  He reports frequent urination due to diuretics.  He is wondering if he should just stop-encouraged to continue diuretics for history of hypertension and leg swelling.  He had blood work done recently that showed slightly worsening serum creatinine 2.4 from his baseline 1.8, GFR dropped to 26 down from his baseline 30-35.  No significant NSAID use at home.  No albuminuria.  No anemia.  Normal electrolytes.  Accepted volume status.  Accepted blood pressure at home average reading 120-130/80.  He reports possible urine retention, frequent urination, incomplete bladder emptying.  Agreeable to get kidney ultrasound    Prior notes     Patient presents today for follow up on chronic kidney disease. Cesar came alone today, his last office visit was March 2023. All lab work discussed with patient today, including GFR 32 and SCr 2.02 which is stable and within his baseline. We will continue to monitor. Patient previously had elevated potassium but it is now within normal range, he is taking Veltassa. He expressed concern about cost of medication and we agreed to a trial of stopping the medication, we will recheck potassium in two months to evaluate levels. We discussed low potassium diet. Patient's blood pressure is under good control today, continue Lisinopril. Patient's uric acid is elevated today at 8.0, he reports no symptoms of gout, he will continue Allopurinol once a day and limit high purine foods. Patient reports he had incidences of ankle edema and PCP stopped amlodipine and started Lasix 20 mg and he reports no recurrences since then.        Per Prior Notes      Cesar was evaluated virtually today. Last office visit August 2023. In interim, repeat blood work showed hyperkalemia 5.8. He started on Veltassa/potassium binder. Repeat blood work in February 2023 showed normal potassium 4.4  "and stable serum creatinine 1.8 in his baseline GFR 37. Within normal electrolytes. He has no complaints or concerns today. He suffers from occasional constipation while on Veltassa-instructed to use MiraLAX as a stool softener and increase fresh fruits and vegetables. No lower urinary tract symptoms. Blood pressure slightly evaded today 150/82 as he check blood pressure after exerting himself. Baseline blood pressure 120-125/80. Overall he is stable        Mr. Wood was evaluated virtually today. He feels well. He is in his baseline state of health. No lower urinary tract symptoms. Blood pressure is in target at home. He did blood work few weeks ago and all results were discussed with him today in details including improved serum creatinine GFR up to 39 within normal electrolytes no albuminuria. He is adherent to medications and low-salt diet.     Her prior notes     Cesar came alone today. He is aware of history of chronic kidney disease. He reports in 2015 got very sick due to bacteremia and E. coli. He developed acute kidney injury at that time. Kidney function improved after receiving antibiotics. He was left with some degree of chronic kidney disease after acute kidney injury fortunately resolved. He reports good urine output. No [place] tract symptoms. No leg swelling or shortness of breath. He feels in his baseline state of health. He used to follow with nephrology in the past and he lost follow-up. No NSAID use. 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  Neurological:      Mental Status: He is oriented " to person, place, and time.   Psychiatric:         Mood and Affect: Mood normal.         Thought Content: Thought content normal.         Judgment: Judgment normal.         Unable to perform today due to virtual visit    ROS     Constitutional: no fever, no chills, no recent weight gain and no recent weight loss.   Eyes: no blurred vision and no diplopia.   ENT: no hearing loss, no earache, no sore throat, no swollen glands in the neck and no nasal discharge.   Cardiovascular: no chest pain, no palpitations and no lower extremity edema.   Respiratory: no shortness of breath, no chronic cough and no shortness of breath during exertion.   Gastrointestinal: no abdominal pain, no constipation, no heartburn, no vomiting, no bloody stools and no change in bowel movements.   Genitourinary: no dysuria and no hematuria.   Musculoskeletal: no arthralgias and no myalgias.   Skin: no rashes and no skin lesions.   Neurological: no headaches and no dizziness.   Psychiatric: no confusion, no depression and no anxiety.   Endocrine: no heat intolerance, no cold intolerance, appetite not increased, no thyroid disorder, no increased urinary frequency and no dry skin.   Hematologic/Lymphatic: does not bleed easily and does not bruise easily.   All other systems have been reviewed and are negative for complaint.            Data Review        Results from last 7 days   Lab Units 04/10/25  0702   [facility] WBC AUTO Thousand/uL 7.1   [facility] HEMOGLOBIN g/dL 13.2   [facility] HEMATOCRIT % 40.9   [facility] PLATELETS AUTO Thousand/uL 190            Lab Results   Component Value Date    URICACID 8.2 (H) 04/10/2025           Lab Results   Component Value Date    HGBA1C 6.1 (H) 01/21/2025           Results from last 7 days   Lab Units 04/10/25  0702   [facility] SODIUM mmol/L 141   [facility] POTASSIUM mmol/L 4.3   [facility] CHLORIDE mmol/L 105   [facility] CO2 mmol/L 25   [facility] BUN mg/dL 35*   [facility] GLUCOSE mg/dL 123   [facility] CALCIUM mg/dL 9.1   [facility] EGFR mL/min/1.73m2 39*            ALBUMIN/CREATININE RATIO, RANDOM URINE   Date Value Ref Range Status   04/10/2025 19 <30 mg/g creat Final     Comment:        The ADA defines abnormalities in albumin  excretion as follows:     Albuminuria Category        Result (mg/g creatinine)     Normal to Mildly increased   <30  Moderately increased            Severely increased           > OR = 300     The ADA recommends that at least two of three  specimens collected within a 3-6 month period be  abnormal before considering a patient to be  within a diagnostic category.       Albumin/Creatinine Ratio   Date Value Ref Range Status   10/05/2024   Final     Comment:     One or more analytes used in this calculation is outside of the analytical measurement range. Calculation cannot be performed.   07/05/2024   Final     Comment:     One or more analytes used in this calculation is outside of the analytical measurement range. Calculation cannot be performed.            RFP  Recent Labs     04/10/25  0702 02/24/25  0936 01/25/25  0628 01/24/25  0612 01/23/25  0612 01/22/25  0607 01/21/25  0950 10/05/24  0809 07/05/24  1131 03/14/24  0753 11/14/23  0827 09/07/23  0743 02/08/23  1102 01/31/23  1136    140 138 139 136 136 136 136 138 139   < > 139 139 138   K 4.3 4.6 4.6 4.5 4.4 5.4* 5.1 4.5 4.8 4.6   < > 4.1 4.4 5.8*    103 104 105 102 103 103 105 105 104   < > 105 108* 104   CO2 25 27 26 24 24 22 22 24 25 25   < > 27 22 26   BUN 35* 40* 68* 74* 71* 64* 56* 55* 69* 54*   < > 45* 47* 45*   CREATININE 1.71* 1.93* 1.98* 2.11* 2.09* 2.61* 2.40* 1.99* 2.85* 2.41*   < > 2.02* 1.80* 1.88*   GLUCOSE 123 161* 241* 244* 224* 192* 241* 111* 88 123*   < > 123* 77 97   CALCIUM 9.1 9.2 8.7 8.8 8.6 8.5* 8.7 8.9 9.4 9.6   < > 9.2 9.5 9.1   PHOS 2.8  --   --   --   --   --   --  2.8 4.1 3.4  --  2.2* 2.5 3.5   EGFR 39* 34* 32* 30* 30* 23* 26* 32* 21* 26*   < >  --   --   --    ANIONGAP  --  10 13 15 14 16 16 12 13 15   < > 11 13 14    < > = values in this  interval not displayed.        Urineanalysis  Recent Labs     08/08/22  0910   COLORU YELLOW   APPEARANCEU HAZY   SPECGRAVU 1.025   SHAWN 5.5   PROTUR NEGATIVE   GLUCOSEU NEGATIVE   BLOODU NEGATIVE   KETONESU NEGATIVE   BILIRUBINU NEGATIVE   NITRITEU NEGATIVE   LEUKOCYTESU NEGATIVE       Urine Electrolytes  Recent Labs     04/10/25  0702 10/05/24  0851 07/05/24  1137 03/14/24  0753 09/07/23  0743 01/30/23  1004 08/08/22  0910   CREATU 112 66.2 39.7 40.4 123.0 110.0 138.0   PROTUR  --   --   --   --   --   --  NEGATIVE   ALBUMINUR 2.1 <7.0 <7.0 <7.0  --   --   --    MICROALBCREA 19  --   --   --  SEE COMMENT SEE COMMENT SEE COMMENT        Urine Micro  Recent Labs     12/01/18  0725   WBCU <1*   RBCU 1*   SQUAMEPIU <1   MUCUSU FEW        Iron  Recent Labs     04/10/25  0702 10/05/24  0809 07/05/24  1131 09/07/23  0743   IRON 57 66 61 67   TIBC 234* 244 241 236*   IRONSAT 24 27 25 28   FERRITIN 546* 561* 606* 576*          Current Outpatient Medications on File Prior to Visit   Medication Sig Dispense Refill    albuterol 90 mcg/actuation inhaler Inhale 2 puffs every 6 hours if needed for wheezing. 18 g 11    allopurinol (Zyloprim) 100 mg tablet Take 1 tablet (100 mg) by mouth once daily. 90 tablet 1    aspirin 81 mg EC tablet Take 1 tablet (81 mg) by mouth once daily.      carvedilol (Coreg) 25 mg tablet Take 0.5 tablets (12.5 mg) by mouth 2 times daily (morning and late afternoon). 180 tablet 1    cholecalciferol (Vitamin D3) 25 MCG (1000 UT) tablet Take 1 tablet (1,000 Units) by mouth once daily.      cyanocobalamin (Vitamin B-12) 1,000 mcg tablet Take 1 tablet (1,000 mcg) by mouth once daily.      finasteride (Proscar) 5 mg tablet Take 1 tablet (5 mg) by mouth once daily. DO NOT CRUSH CHEW OR SPLIT 90 tablet 1    furosemide (Lasix) 20 mg tablet Take 1 tablet (20 mg) by mouth once daily. 90 tablet 1    levothyroxine (Synthroid, Levoxyl) 50 mcg tablet TAKE ONE TABLET BY MOUTH ONCE EVERY DAY 90 tablet 0    lisinopril 5  mg tablet Take 2 tablets (10 mg) by mouth once daily. (Patient taking differently: Take 1 tablet (5 mg) by mouth once daily.) 60 tablet 11    pravastatin (Pravachol) 20 mg tablet Take 1 tablet (20 mg) by mouth once daily. 90 tablet 1    tamsulosin (Flomax) 0.4 mg 24 hr capsule TAKE ONE CAPSULE BY MOUTH EVERY DAY 30 MINUTES AFTER THE SAME MEAL EACH DAY 90 capsule 1     No current facility-administered medications on file prior to visit.           Assessment and Plan       Cesar Wood is a 85 y.o. male who has past medical history of BPH, gout, hypertension, hypothyroidism, and CKD presented to the clinic via virtual visit.        Impression  #Chronic kidney disease stage III/A1. Most likely related to atherosclerotic cardiovascular disease  -Baseline serum creatinine 1.8-2, GFR 30-35 mils per minute per 1.73 mÂ².   -Kidney function back to baseline improved significantly from previously to Cr 1.7, GFR 39 from Cr 2.85 GFR 32  -No protein leak.   - Had kidney ultrasound with postvoid scan to rule out retention, but bladder was not full at the beginning of the exam, so difficult to rule-out retention. He currently takes flomax, continue.  -Kidney ultrasound shows bilateral cysts consistent with previous study in December 2021   -Hyperkalemia-resolved from before. Been off Veltassa.  Continue low potassium diet.  Continue low-dose lisinopril 5 mg daily  -Kidney ultrasound done in December 2021 was reviewed and showed bilateral cysts otherwise unremarkable-repeat in the setting of worsening kidney function      #Hypertension-blood pressure is in good control. Acceptable today  (checked at home)  -Lowered his lisinopril previously from 40 to 5 mg given sudden drop in kidney function. Kidney function significantly improved and BP holding with no protein leak. Will continue with 5 mg lisinopril for now. If needed can increase in the future.   -Continue current medication lisinopril 5 mg, carvedilol 12.5 mg twice daily,  and Lasix 20 mg.   -Advised to continue checking BP at home. If BP>150/90 to notify us.        #Ankle Edema -- PCP previously stopped Amlodipine and began Lasix 20 mg daily, no recurrences since then. Continue same     #BMD  -PTH and VitD WNL       #No significant anemia  -Hgb 13.3 stable     #Gout with no recent flare or symptoms-on allopurinol 100 mg.  -Last check for uric acid level was is above target 8.2  - Increase allopurinol 100 mg twice daily     #CVS  -Continue statins, RAAS inhibitors        #BPH-positive lower urinary tract symptoms. Continue finasteride and tamsulosin     Follow-up in 6 months with repeat blood work and urinalysis prior to visit.     Too Augustine MD, MS, ELEN SANDOVAL   Clinical  - [facility] [employer]   Nephrologist - [facility]

## 2025-04-15 NOTE — PATIENT INSTRUCTIONS
Dear SERGEI   It was nice seeing you in the nephrology clinic today     Today we discussed the following:     #Chronic kidney disease stage III-kidney function is improving from 21 up to 32%-now up to 39% after reducing lisinopril from 40 mg down to 5 mg once a daily-continue same    #Hypertension-accepted controlled.  Continue lisinopril 5 mg, carvedilol 12.5 mg twice daily, furosemide 20 mg daily.  Continue to monitor blood pressure     #High potassium-now normal.  No need for potassium binder/Veltassa at this time as you already not taking it     #Elevated uric acid-increase allopurinol 100 mg twice a day. No recent gout    # Enlarged prostate-continue tamsulosin/Flomax        Follow-up in 6 months with repeat blood work and urinalysis prior to the visit. We can do virtual visit if you prefer     Please call our office if you have any question  Thank you for coming to see me today     Too Augustine MD, MS, ELEN SANDOVAL  Clinical  - [facility] [employer]  Nephrologist - [facility]

## 2025-04-17 ENCOUNTER — APPOINTMENT (OUTPATIENT)
Dept: NEPHROLOGY | Facility: CLINIC | Age: 85
End: 2025-04-17
Payer: MEDICARE

## 2025-05-14 DIAGNOSIS — E03.9 HYPOTHYROIDISM, UNSPECIFIED TYPE: ICD-10-CM

## 2025-05-14 RX ORDER — LEVOTHYROXINE SODIUM 50 UG/1
50 TABLET ORAL DAILY
Qty: 90 TABLET | Refills: 0 | Status: SHIPPED | OUTPATIENT
Start: 2025-05-14 | End: 2025-05-15

## 2025-05-15 RX ORDER — LEVOTHYROXINE SODIUM 50 UG/1
50 TABLET ORAL DAILY
Qty: 90 TABLET | Refills: 0 | Status: SHIPPED | OUTPATIENT
Start: 2025-05-15

## 2025-06-03 ENCOUNTER — APPOINTMENT (OUTPATIENT)
Dept: PRIMARY CARE | Facility: CLINIC | Age: 85
End: 2025-06-03
Payer: MEDICARE

## 2025-06-03 VITALS
BODY MASS INDEX: 31.13 KG/M2 | WEIGHT: 210.8 LBS | HEART RATE: 77 BPM | DIASTOLIC BLOOD PRESSURE: 78 MMHG | TEMPERATURE: 97.2 F | OXYGEN SATURATION: 98 % | SYSTOLIC BLOOD PRESSURE: 124 MMHG

## 2025-06-03 DIAGNOSIS — I10 HYPERTENSION, UNCONTROLLED: ICD-10-CM

## 2025-06-03 DIAGNOSIS — R05.3 CHRONIC COUGH: Primary | ICD-10-CM

## 2025-06-03 DIAGNOSIS — E03.9 ACQUIRED HYPOTHYROIDISM: ICD-10-CM

## 2025-06-03 DIAGNOSIS — N18.32 STAGE 3B CHRONIC KIDNEY DISEASE (MULTI): ICD-10-CM

## 2025-06-03 DIAGNOSIS — E78.00 HYPERCHOLESTEROLEMIA: ICD-10-CM

## 2025-06-03 PROCEDURE — 99214 OFFICE O/P EST MOD 30 MIN: CPT | Performed by: INTERNAL MEDICINE

## 2025-06-03 PROCEDURE — G2211 COMPLEX E/M VISIT ADD ON: HCPCS | Performed by: INTERNAL MEDICINE

## 2025-06-03 PROCEDURE — 3074F SYST BP LT 130 MM HG: CPT | Performed by: INTERNAL MEDICINE

## 2025-06-03 PROCEDURE — 3078F DIAST BP <80 MM HG: CPT | Performed by: INTERNAL MEDICINE

## 2025-06-03 PROCEDURE — 1159F MED LIST DOCD IN RCRD: CPT | Performed by: INTERNAL MEDICINE

## 2025-06-03 PROCEDURE — 1160F RVW MEDS BY RX/DR IN RCRD: CPT | Performed by: INTERNAL MEDICINE

## 2025-06-03 PROCEDURE — 1036F TOBACCO NON-USER: CPT | Performed by: INTERNAL MEDICINE

## 2025-06-03 ASSESSMENT — ENCOUNTER SYMPTOMS
SINUS PAIN: 0
HYPERTENSION: 1
COUGH: 1
DIZZINESS: 0
HEADACHES: 0
FEVER: 0
BLOOD IN STOOL: 0
WHEEZING: 0
UNEXPECTED WEIGHT CHANGE: 0
PALPITATIONS: 0
SORE THROAT: 0
DIARRHEA: 0
ABDOMINAL PAIN: 0
BRUISES/BLEEDS EASILY: 0
DIFFICULTY URINATING: 0
FATIGUE: 0
ARTHRALGIAS: 0

## 2025-06-03 NOTE — PROGRESS NOTES
Subjective   Patient ID: Cesar Wood is a 85 y.o. male who presents for Hyperlipidemia and Hypertension.     - Recent blood work and plan of care reviewed  - Chronic kidney disease improving seen by nephrology lisinopril adjusted to 5 mg daily renal function improving avoid salt and NSAIDs increase fluid intake  - Chronic cough after bronchitis not resolving continues Claritin daily  Obtain CT chest follow-up pneumonia for further recommendation  -- Deconditioning increase activity multivitamins  - Chronic gout controlled continues allopurinol no recurrence  - BPH continue with tamsulosin symptoms are controlled  - Chronic leg swelling improved continue with Lasix 20 mg daily continue low-salt diet continue with current medication.  -- Hypothyroid controlled continue levothyroxine follow-up thyroid function test in 6 months  Follow-up 3 months      Hyperlipidemia  Pertinent negatives include no chest pain.   Hypertension  Pertinent negatives include no chest pain, headaches or palpitations.          Review of Systems   Constitutional:  Negative for fatigue, fever and unexpected weight change.   HENT:  Negative for congestion, ear discharge, ear pain, mouth sores, sinus pain and sore throat.    Eyes:  Negative for visual disturbance.   Respiratory:  Positive for cough. Negative for wheezing.    Cardiovascular:  Negative for chest pain, palpitations and leg swelling.   Gastrointestinal:  Negative for abdominal pain, blood in stool and diarrhea.   Genitourinary:  Negative for difficulty urinating.   Musculoskeletal:  Negative for arthralgias.   Skin:  Negative for rash.   Neurological:  Negative for dizziness and headaches.   Hematological:  Does not bruise/bleed easily.   Psychiatric/Behavioral:  Negative for behavioral problems.    All other systems reviewed and are negative.      Objective   Lab Results   Component Value Date    HGBA1C 6.1 (H) 01/21/2025      /78   Pulse 77   Temp 36.2 °C (97.2 °F)    Wt 95.6 kg (210 lb 12.8 oz)   SpO2 98%   BMI 31.13 kg/m²   Lab Results   Component Value Date    WBC 7.1 04/10/2025    HGB 13.2 04/10/2025    HCT 40.9 04/10/2025     04/10/2025    CHOL 144 03/18/2024    TRIG 264 (H) 03/18/2024    HDL 32.6 03/18/2024    ALT 14 01/21/2025    AST 18 01/21/2025     04/10/2025    K 4.3 04/10/2025     04/10/2025    CREATININE 1.71 (H) 04/10/2025    BUN 35 (H) 04/10/2025    CO2 25 04/10/2025    TSH 3.29 03/18/2024    PSA 0.57 10/26/2017    HGBA1C 6.1 (H) 01/21/2025     par   Physical Exam  Vitals and nursing note reviewed.   Constitutional:       Appearance: Normal appearance.   HENT:      Head: Normocephalic.      Nose: Nose normal.   Eyes:      Conjunctiva/sclera: Conjunctivae normal.      Pupils: Pupils are equal, round, and reactive to light.   Cardiovascular:      Rate and Rhythm: Regular rhythm.   Pulmonary:      Effort: Pulmonary effort is normal.      Breath sounds: Normal breath sounds.   Abdominal:      General: Abdomen is flat.      Palpations: Abdomen is soft.   Musculoskeletal:      Cervical back: Neck supple.   Skin:     General: Skin is warm.   Neurological:      General: No focal deficit present.      Mental Status: He is oriented to person, place, and time.   Psychiatric:         Mood and Affect: Mood normal.         Assessment/Plan   Cesar was seen today for hyperlipidemia and hypertension.  Diagnoses and all orders for this visit:  Chronic cough (Primary)  -     CT chest wo IV contrast; Future  Hypercholesterolemia  Hypertension, uncontrolled  Acquired hypothyroidism  Stage 3b chronic kidney disease (Multi)  Other orders  -     Follow Up In Primary Care - Established  -     Follow Up In Primary Care - Established; Future    - Recent blood work and plan of care reviewed  - Chronic kidney disease improving seen by nephrology lisinopril adjusted to 5 mg daily renal function improving avoid salt and NSAIDs increase fluid intake  - Chronic cough after  bronchitis not resolving continues Claritin daily  Obtain CT chest follow-up pneumonia for further recommendation  -- Deconditioning increase activity multivitamins  - Chronic gout controlled continues allopurinol no recurrence  - BPH continue with tamsulosin symptoms are controlled  - Chronic leg swelling improved continue with Lasix 20 mg daily continue low-salt diet continue with current medication.  -- Hypothyroid controlled continue levothyroxine follow-up thyroid function test in 6 months  Follow-up 3 months

## 2025-06-05 ENCOUNTER — HOSPITAL ENCOUNTER (OUTPATIENT)
Dept: RADIOLOGY | Facility: HOSPITAL | Age: 85
Discharge: HOME | End: 2025-06-05
Payer: MEDICARE

## 2025-06-05 DIAGNOSIS — R05.3 CHRONIC COUGH: ICD-10-CM

## 2025-06-05 PROCEDURE — 71250 CT THORAX DX C-: CPT | Performed by: RADIOLOGY

## 2025-06-05 PROCEDURE — 71250 CT THORAX DX C-: CPT

## 2025-08-28 DIAGNOSIS — I10 HYPERTENSION, UNCONTROLLED: ICD-10-CM

## 2025-08-28 DIAGNOSIS — N18.4 STAGE 4 CHRONIC KIDNEY DISEASE (MULTI): ICD-10-CM

## 2025-08-28 DIAGNOSIS — E78.00 HYPERCHOLESTEROLEMIA: ICD-10-CM

## 2025-08-28 DIAGNOSIS — R60.9 EDEMA, UNSPECIFIED TYPE: ICD-10-CM

## 2025-08-28 RX ORDER — FUROSEMIDE 20 MG/1
20 TABLET ORAL DAILY
Qty: 90 TABLET | Refills: 0 | Status: SHIPPED | OUTPATIENT
Start: 2025-08-28

## 2025-08-28 RX ORDER — TAMSULOSIN HYDROCHLORIDE 0.4 MG/1
CAPSULE ORAL
Qty: 90 CAPSULE | Refills: 0 | Status: SHIPPED | OUTPATIENT
Start: 2025-08-28

## 2025-08-28 RX ORDER — PRAVASTATIN SODIUM 20 MG/1
20 TABLET ORAL DAILY
Qty: 90 TABLET | Refills: 0 | Status: SHIPPED | OUTPATIENT
Start: 2025-08-28

## 2025-08-28 RX ORDER — FINASTERIDE 5 MG/1
5 TABLET, FILM COATED ORAL DAILY
Qty: 90 TABLET | Refills: 0 | Status: SHIPPED | OUTPATIENT
Start: 2025-08-28

## 2025-09-09 ENCOUNTER — APPOINTMENT (OUTPATIENT)
Dept: PRIMARY CARE | Facility: CLINIC | Age: 85
End: 2025-09-09
Payer: MEDICARE

## 2025-11-03 ENCOUNTER — APPOINTMENT (OUTPATIENT)
Dept: NEPHROLOGY | Facility: CLINIC | Age: 85
End: 2025-11-03
Payer: MEDICARE